# Patient Record
Sex: MALE | Race: WHITE | NOT HISPANIC OR LATINO | Employment: FULL TIME | URBAN - METROPOLITAN AREA
[De-identification: names, ages, dates, MRNs, and addresses within clinical notes are randomized per-mention and may not be internally consistent; named-entity substitution may affect disease eponyms.]

---

## 2017-02-20 ENCOUNTER — OFFICE VISIT (OUTPATIENT)
Dept: FAMILY MEDICINE CLINIC | Facility: CLINIC | Age: 37
End: 2017-02-20

## 2017-02-20 VITALS
HEIGHT: 72 IN | RESPIRATION RATE: 16 BRPM | TEMPERATURE: 98.2 F | BODY MASS INDEX: 38.31 KG/M2 | HEART RATE: 76 BPM | DIASTOLIC BLOOD PRESSURE: 80 MMHG | WEIGHT: 282.8 LBS | SYSTOLIC BLOOD PRESSURE: 110 MMHG

## 2017-02-20 DIAGNOSIS — F41.9 ANXIETY: ICD-10-CM

## 2017-02-20 DIAGNOSIS — E29.1 HYPOGONADISM IN MALE: ICD-10-CM

## 2017-02-20 DIAGNOSIS — J30.9 ATOPIC RHINITIS: ICD-10-CM

## 2017-02-20 DIAGNOSIS — E78.2 MIXED HYPERLIPIDEMIA: Primary | ICD-10-CM

## 2017-02-20 DIAGNOSIS — R74.8 ABNORMAL LIVER ENZYMES: ICD-10-CM

## 2017-02-20 PROCEDURE — 99214 OFFICE O/P EST MOD 30 MIN: CPT | Performed by: FAMILY MEDICINE

## 2017-02-20 RX ORDER — TESTOSTERONE 10 MG/.5G
2 GEL, METERED TOPICAL DAILY
Qty: 180 G | Refills: 3 | Status: SHIPPED | OUTPATIENT
Start: 2017-02-20 | End: 2017-12-15 | Stop reason: SDUPTHER

## 2017-02-20 NOTE — PROGRESS NOTES
Subjective    FU HLP, hypogonadism, elevated LFT's, anxiety & AR.   NOTE: pt is fasting for labs.   Needs RF on Testosterone gel today.    Gerson Simons is a 36 y.o. male.     History of Present Illness     Really changing diet - cut sugar out, watching the kind of carbs he eats; excited about changes in how feeling - increased energy.    Gym, [employer] at home - really started about two weeks ago    Overall been feeling well.  Work going well.    Anxiety has been good overall.     Going through the foster to adopt process with the state.    The following portions of the patient's history were reviewed and updated as appropriate: allergies, current medications, past medical history, past social history and problem list.    Review of Systems   Constitutional: Negative.  Negative for unexpected weight change.   HENT: Negative.    Respiratory: Negative.  Negative for cough and shortness of breath.    Cardiovascular: Negative.  Negative for chest pain.   Gastrointestinal: Negative.    Musculoskeletal: Positive for myalgias (from his recent workouts!).   Skin: Negative.  Negative for rash.   Neurological: Negative.  Negative for dizziness and headaches.   Psychiatric/Behavioral: Negative.        Objective   Physical Exam   Constitutional: He appears well-developed and well-nourished.   HENT:   Head: Normocephalic.   Eyes: Pupils are equal, round, and reactive to light.   Neck: Neck supple. Carotid bruit is not present.   Cardiovascular: Normal rate and regular rhythm.    Pulmonary/Chest: Effort normal and breath sounds normal.   Lymphadenopathy:     He has no cervical adenopathy.   Neurological: He is alert.   Skin: Skin is warm and dry.   Psychiatric: He has a normal mood and affect. His behavior is normal.   Nursing note and vitals reviewed.      Assessment/Plan   Gerson was seen today for follow-up, hyperlipidemia, hypogonadism, elevated lft's, anxiety and allergic rhinitis.    Diagnoses and all orders for this  visit:    Mixed hyperlipidemia  -     Comprehensive Metabolic Panel  -     Lipid Panel With LDL / HDL Ratio    Abnormal liver enzymes  -     Comprehensive Metabolic Panel  -     Gamma GT    Hypogonadism in male  -     Testosterone  -     Testosterone 10 MG/ACT (2%) gel; Place 2 Squirts on the skin Daily.    Atopic rhinitis    Anxiety    Overall doing well. Labs as noted. Great lifestyle changes - encouraged to keep it up!    Recheck six months.

## 2017-02-21 LAB
ALBUMIN SERPL-MCNC: 4.2 G/DL (ref 3.2–4.8)
ALBUMIN/GLOB SERPL: 1.5 G/DL (ref 1.5–2.5)
ALP SERPL-CCNC: 75 U/L (ref 25–100)
ALT SERPL-CCNC: 46 U/L (ref 7–40)
AST SERPL-CCNC: 27 U/L (ref 0–33)
BILIRUB SERPL-MCNC: 0.4 MG/DL (ref 0.3–1.2)
BUN SERPL-MCNC: 11 MG/DL (ref 9–23)
BUN/CREAT SERPL: 12.2 (ref 7–25)
CALCIUM SERPL-MCNC: 9.6 MG/DL (ref 8.7–10.4)
CHLORIDE SERPL-SCNC: 105 MMOL/L (ref 99–109)
CHOLEST SERPL-MCNC: 189 MG/DL (ref 0–200)
CO2 SERPL-SCNC: 30 MMOL/L (ref 20–31)
CONV COMMENT: ABNORMAL
CREAT SERPL-MCNC: 0.9 MG/DL (ref 0.6–1.3)
GGT SERPL-CCNC: 38 U/L (ref 0–72)
GLOBULIN SER CALC-MCNC: 2.8 GM/DL
GLUCOSE SERPL-MCNC: 93 MG/DL (ref 70–100)
HDLC SERPL-MCNC: 37 MG/DL (ref 40–60)
LDLC SERPL CALC-MCNC: 108 MG/DL (ref 0–100)
LDLC/HDLC SERPL: 2.93 {RATIO}
POTASSIUM SERPL-SCNC: 4.7 MMOL/L (ref 3.5–5.5)
PROT SERPL-MCNC: 7 G/DL (ref 5.7–8.2)
SODIUM SERPL-SCNC: 142 MMOL/L (ref 132–146)
TESTOST SERPL-MCNC: 147 NG/DL (ref 348–1197)
TRIGL SERPL-MCNC: 218 MG/DL (ref 0–150)
VLDLC SERPL CALC-MCNC: 43.6 MG/DL

## 2017-04-04 ENCOUNTER — OFFICE VISIT (OUTPATIENT)
Dept: FAMILY MEDICINE CLINIC | Facility: CLINIC | Age: 37
End: 2017-04-04

## 2017-04-04 VITALS
DIASTOLIC BLOOD PRESSURE: 78 MMHG | SYSTOLIC BLOOD PRESSURE: 106 MMHG | OXYGEN SATURATION: 98 % | TEMPERATURE: 97.1 F | RESPIRATION RATE: 18 BRPM | HEIGHT: 72 IN | BODY MASS INDEX: 38.33 KG/M2 | HEART RATE: 88 BPM | WEIGHT: 283 LBS

## 2017-04-04 DIAGNOSIS — F41.9 ANXIETY: Primary | ICD-10-CM

## 2017-04-04 DIAGNOSIS — F33.0 MILD EPISODE OF RECURRENT MAJOR DEPRESSIVE DISORDER (HCC): ICD-10-CM

## 2017-04-04 PROBLEM — F43.10 PTSD (POST-TRAUMATIC STRESS DISORDER): Status: ACTIVE | Noted: 2017-04-04

## 2017-04-04 PROCEDURE — 99213 OFFICE O/P EST LOW 20 MIN: CPT | Performed by: FAMILY MEDICINE

## 2017-04-04 RX ORDER — VENLAFAXINE HYDROCHLORIDE 75 MG/1
CAPSULE, EXTENDED RELEASE ORAL
Qty: 60 CAPSULE | Refills: 1 | Status: SHIPPED | OUTPATIENT
Start: 2017-04-04 | End: 2017-04-18 | Stop reason: SDUPTHER

## 2017-04-04 NOTE — PROGRESS NOTES
Subjective   Gerson Simons is a 36 y.o. male.     History of Present Illness     He has been on zoloft for quite a while  It did help him at first and made him feel better.  Did still have some anxiety with being in the mall and around people    He notes that his motivation is low    When he first started medicine:  Had some pTSD from the  and this was causing some mood issues  No desire to do anything  Was never happy about anything  Never had SI nor SI  Did not want to be around people and then developed some social phobia and was anxious around people  Just wants to lay around        Review of Systems   Constitutional: Negative.    Psychiatric/Behavioral: Positive for dysphoric mood. The patient is nervous/anxious.        Objective   Physical Exam   Constitutional: He appears well-developed and well-nourished. No distress.   Cardiovascular: Normal rate, regular rhythm and normal heart sounds.    Pulmonary/Chest: Effort normal and breath sounds normal.   Psychiatric: He has a normal mood and affect. His speech is normal and behavior is normal. Judgment and thought content normal. Cognition and memory are normal.   No SI and no HI   Nursing note and vitals reviewed.      Assessment/Plan   Gerson was seen today for anxiety and med refill.    Diagnoses and all orders for this visit:    Anxiety  -     venlafaxine XR (EFFEXOR XR) 75 MG 24 hr capsule; 1 po daily 6 days then 2 po daily60    Mild episode of recurrent major depressive disorder  -     venlafaxine XR (EFFEXOR XR) 75 MG 24 hr capsule; 1 po daily 6 days then 2 po daily60      He asks about effexor and will try it, titrate to 150.  Pt to call back in one month and consider continuing this dose or resuming zoloft.  Pt agrees and we will await his call

## 2017-04-06 ENCOUNTER — TELEPHONE (OUTPATIENT)
Dept: FAMILY MEDICINE CLINIC | Facility: CLINIC | Age: 37
End: 2017-04-06

## 2017-04-06 NOTE — TELEPHONE ENCOUNTER
Autumn at [facility] called because they were not sure if you were aware that when pt was switched to Fortesta from Androgel in Nov 2015 his dose dropped quite a bit. He had been on two pumps a day of Androgel and when switched to Fortesta it remained two pumps a day but the mg is different on Fortesta. He may not be getting enough. The starting dose of Fortesta is two pumps applied to each thigh daily for a total of 4 pumps daily.  I spoke w/ pt and he has only being doing a total of 2 pumps each day and his testosterone levels are still low.  Do you want to change this Rx?  I need to call both the pt and [facility] back with this answer.

## 2017-04-06 NOTE — TELEPHONE ENCOUNTER
----- Message from Keisha Feliciano sent at 4/6/2017  2:49 PM EDT -----  Contact: DIONTE BOURGEOIS HAS QUESTIONS FOR THE DOSAGE AND DIRECTIONS FOR THE TESTOSTERONE MEDICATION    [phone]

## 2017-04-09 NOTE — TELEPHONE ENCOUNTER
Well that likely explains his drop over time.  Increase his current dosing to two pumps to each thigh daily (4 total) and a QS for a month with 5 RFs.  Recheck level in 6 weeks.

## 2017-04-10 NOTE — TELEPHONE ENCOUNTER
Informed pt that we did want 2 pumps each thigh and I already got the prior auth for 3 bottles for a 90 day supply.

## 2017-04-18 ENCOUNTER — TELEPHONE (OUTPATIENT)
Dept: FAMILY MEDICINE CLINIC | Facility: CLINIC | Age: 37
End: 2017-04-18

## 2017-04-18 DIAGNOSIS — F33.0 MILD EPISODE OF RECURRENT MAJOR DEPRESSIVE DISORDER (HCC): ICD-10-CM

## 2017-04-18 DIAGNOSIS — F41.9 ANXIETY: ICD-10-CM

## 2017-04-18 RX ORDER — VENLAFAXINE HYDROCHLORIDE 150 MG/1
CAPSULE, EXTENDED RELEASE ORAL
Qty: 30 CAPSULE | Refills: 1 | Status: SHIPPED | OUTPATIENT
Start: 2017-04-18 | End: 2017-04-26 | Stop reason: SDUPTHER

## 2017-04-18 NOTE — TELEPHONE ENCOUNTER
----- Message from Keisha Feliciano sent at 4/18/2017 11:22 AM EDT -----  Contact: JORGE  PATIENT HAS TRIED THE MEDICATION VENLAFAXINE A FEW WEEKS AS INSTRUCTED AND IS DOING WELL. HE WOULD LIKE TO GET A RX CALLED INTO [facility] PLEASE.    VENLAFAXINE  [facility]

## 2017-04-18 NOTE — TELEPHONE ENCOUNTER
DETAILED VM LEFT FOR PT OF THIS MESSAGE AND REMINDER CARD WENT OUT TO PT TO SCHEDULE APPT IN 4 WKS.

## 2017-04-26 ENCOUNTER — OFFICE VISIT (OUTPATIENT)
Dept: FAMILY MEDICINE CLINIC | Facility: CLINIC | Age: 37
End: 2017-04-26

## 2017-04-26 VITALS
BODY MASS INDEX: 37.38 KG/M2 | HEART RATE: 74 BPM | SYSTOLIC BLOOD PRESSURE: 118 MMHG | DIASTOLIC BLOOD PRESSURE: 82 MMHG | HEIGHT: 72 IN | TEMPERATURE: 97 F | RESPIRATION RATE: 18 BRPM | WEIGHT: 276 LBS

## 2017-04-26 DIAGNOSIS — F33.0 MILD EPISODE OF RECURRENT MAJOR DEPRESSIVE DISORDER (HCC): Primary | ICD-10-CM

## 2017-04-26 DIAGNOSIS — F41.9 ANXIETY: ICD-10-CM

## 2017-04-26 DIAGNOSIS — Z83.49 FAMILY HISTORY OF THYROID DISEASE IN SISTER: ICD-10-CM

## 2017-04-26 LAB — TSH SERPL DL<=0.005 MIU/L-ACNC: 0.68 MIU/ML (ref 0.35–5.35)

## 2017-04-26 PROCEDURE — 99213 OFFICE O/P EST LOW 20 MIN: CPT | Performed by: FAMILY MEDICINE

## 2017-04-26 RX ORDER — VENLAFAXINE HYDROCHLORIDE 150 MG/1
CAPSULE, EXTENDED RELEASE ORAL
Qty: 30 CAPSULE | Refills: 3 | Status: SHIPPED | OUTPATIENT
Start: 2017-04-26 | End: 2017-06-07 | Stop reason: SDUPTHER

## 2017-04-26 NOTE — PROGRESS NOTES
Subjective   Gerson Simons is a 36 y.o. male.     History of Present Illness     He and his family have noticed an improvement in his mood  less irritable  He wants to do more things and enjoys activities more  Not staying in the house and avoiding things like he did before  No SE with the medicine    He also asks about getting his thyroid checked, family members with this as an issue      Review of Systems   Constitutional: Negative.        Objective   Physical Exam   Constitutional: He appears well-developed and well-nourished. No distress.   Cardiovascular: Normal rate, regular rhythm and normal heart sounds.    Pulmonary/Chest: Effort normal and breath sounds normal.   Psychiatric: He has a normal mood and affect. His behavior is normal.   Nursing note and vitals reviewed.      Assessment/Plan   Gerson was seen today for follow-up.    Diagnoses and all orders for this visit:    Mild episode of recurrent major depressive disorder  -     venlafaxine XR (EFFEXOR-XR) 150 MG 24 hr capsule; 1 po daily    Anxiety  -     venlafaxine XR (EFFEXOR-XR) 150 MG 24 hr capsule; 1 po daily  -     TSH    Family history of thyroid disease in sister  -     TSH    effexor working well  Will recheck his thyroid due to family history

## 2017-06-02 DIAGNOSIS — F33.0 MILD EPISODE OF RECURRENT MAJOR DEPRESSIVE DISORDER (HCC): ICD-10-CM

## 2017-06-02 DIAGNOSIS — F41.9 ANXIETY: ICD-10-CM

## 2017-06-02 RX ORDER — VENLAFAXINE HYDROCHLORIDE 150 MG/1
CAPSULE, EXTENDED RELEASE ORAL
Qty: 90 CAPSULE | Refills: 0 | Status: SHIPPED | OUTPATIENT
Start: 2017-06-02 | End: 2017-06-07

## 2017-06-07 ENCOUNTER — OFFICE VISIT (OUTPATIENT)
Dept: FAMILY MEDICINE CLINIC | Facility: CLINIC | Age: 37
End: 2017-06-07

## 2017-06-07 VITALS
HEIGHT: 72 IN | HEART RATE: 88 BPM | SYSTOLIC BLOOD PRESSURE: 118 MMHG | RESPIRATION RATE: 20 BRPM | DIASTOLIC BLOOD PRESSURE: 80 MMHG | WEIGHT: 280.5 LBS | TEMPERATURE: 96.8 F | BODY MASS INDEX: 37.99 KG/M2

## 2017-06-07 DIAGNOSIS — F33.0 MILD EPISODE OF RECURRENT MAJOR DEPRESSIVE DISORDER (HCC): ICD-10-CM

## 2017-06-07 DIAGNOSIS — F41.9 ANXIETY: ICD-10-CM

## 2017-06-07 DIAGNOSIS — R63.5 WEIGHT GAIN: Primary | ICD-10-CM

## 2017-06-07 PROCEDURE — 99213 OFFICE O/P EST LOW 20 MIN: CPT | Performed by: FAMILY MEDICINE

## 2017-06-07 RX ORDER — PHENTERMINE HYDROCHLORIDE 37.5 MG/1
37.5 TABLET ORAL
Qty: 30 TABLET | Refills: 1 | Status: SHIPPED | OUTPATIENT
Start: 2017-06-07 | End: 2017-08-03 | Stop reason: SDUPTHER

## 2017-06-07 RX ORDER — VENLAFAXINE HYDROCHLORIDE 150 MG/1
CAPSULE, EXTENDED RELEASE ORAL
Qty: 30 CAPSULE | Refills: 3 | Status: SHIPPED | OUTPATIENT
Start: 2017-06-07 | End: 2017-12-15 | Stop reason: SDUPTHER

## 2017-06-07 NOTE — PROGRESS NOTES
Subjective   Gerson Simosn is a 36 y.o. male.     History of Present Illness     He is interested in using phentermine again  He used it about 5 years ago and it really helped  He has been exercising  He has been reducing unhealthy food intake as a diet plan  His appetite is just a big issue    His mood has been doing great on the effexor  Family has noted this as well      Review of Systems   Constitutional: Negative.        Objective   Physical Exam   Constitutional: He appears well-developed and well-nourished. No distress.   Cardiovascular: Normal rate, regular rhythm and normal heart sounds.    Pulmonary/Chest: Effort normal and breath sounds normal.   Psychiatric: He has a normal mood and affect. His behavior is normal.   Nursing note and vitals reviewed.      Assessment/Plan   Gerson was seen today for weight gain.    Diagnoses and all orders for this visit:    Weight gain  -     phentermine (ADIPEX-P) 37.5 MG tablet; Take 1 tablet by mouth Every Morning Before Breakfast.    Anxiety  -     venlafaxine XR (EFFEXOR-XR) 150 MG 24 hr capsule; 1 po daily    Mild episode of recurrent major depressive disorder  -     venlafaxine XR (EFFEXOR-XR) 150 MG 24 hr capsule; 1 po daily    reviewed normal thyroid, cholesterol from 4/2017 and ok to use phentermine for 2 months.  Pros/cons/SE discussed with pt.  Diet and exercise emphasized.  Mood doing great on effexor, no change with this medicine

## 2017-08-03 DIAGNOSIS — R63.5 WEIGHT GAIN: ICD-10-CM

## 2017-08-04 RX ORDER — PHENTERMINE HYDROCHLORIDE 37.5 MG/1
TABLET ORAL
Qty: 30 TABLET | Refills: 0 | OUTPATIENT
Start: 2017-08-04 | End: 2017-12-15

## 2017-08-14 RX ORDER — MONTELUKAST SODIUM 10 MG/1
TABLET ORAL
Qty: 90 TABLET | Refills: 1 | Status: SHIPPED | OUTPATIENT
Start: 2017-08-14 | End: 2017-12-15 | Stop reason: SDUPTHER

## 2017-10-13 DIAGNOSIS — F41.9 ANXIETY: ICD-10-CM

## 2017-12-15 ENCOUNTER — OFFICE VISIT (OUTPATIENT)
Dept: FAMILY MEDICINE CLINIC | Facility: CLINIC | Age: 37
End: 2017-12-15

## 2017-12-15 VITALS
BODY MASS INDEX: 38.4 KG/M2 | TEMPERATURE: 98 F | RESPIRATION RATE: 18 BRPM | OXYGEN SATURATION: 98 % | SYSTOLIC BLOOD PRESSURE: 124 MMHG | DIASTOLIC BLOOD PRESSURE: 88 MMHG | HEIGHT: 72 IN | WEIGHT: 283.5 LBS | HEART RATE: 88 BPM

## 2017-12-15 DIAGNOSIS — F41.9 ANXIETY: ICD-10-CM

## 2017-12-15 DIAGNOSIS — E78.2 MIXED HYPERLIPIDEMIA: Primary | ICD-10-CM

## 2017-12-15 DIAGNOSIS — J30.89 CHRONIC NONSEASONAL ALLERGIC RHINITIS DUE TO OTHER ALLERGEN: ICD-10-CM

## 2017-12-15 DIAGNOSIS — E29.1 HYPOGONADISM IN MALE: ICD-10-CM

## 2017-12-15 DIAGNOSIS — F33.0 MILD EPISODE OF RECURRENT MAJOR DEPRESSIVE DISORDER (HCC): ICD-10-CM

## 2017-12-15 PROCEDURE — 99214 OFFICE O/P EST MOD 30 MIN: CPT | Performed by: FAMILY MEDICINE

## 2017-12-15 RX ORDER — TESTOSTERONE 10 MG/.5G
2 GEL, METERED TOPICAL DAILY
Qty: 180 G | Refills: 1 | Status: SHIPPED | OUTPATIENT
Start: 2017-12-15 | End: 2018-01-27 | Stop reason: SDUPTHER

## 2017-12-15 RX ORDER — MONTELUKAST SODIUM 10 MG/1
10 TABLET ORAL NIGHTLY
Qty: 90 TABLET | Refills: 1 | Status: SHIPPED | OUTPATIENT
Start: 2017-12-15 | End: 2018-09-17 | Stop reason: SDUPTHER

## 2017-12-15 RX ORDER — VENLAFAXINE HYDROCHLORIDE 150 MG/1
CAPSULE, EXTENDED RELEASE ORAL
Qty: 90 CAPSULE | Refills: 1 | Status: SHIPPED | OUTPATIENT
Start: 2017-12-15 | End: 2018-09-11 | Stop reason: SDUPTHER

## 2017-12-15 NOTE — PROGRESS NOTES
Assessment/Plan     Problem List Items Addressed This Visit        Cardiovascular and Mediastinum    Mixed hyperlipidemia - Primary    Relevant Orders    Comprehensive Metabolic Panel    Lipid Panel With / Chol / HDL Ratio       Respiratory    Atopic rhinitis    Relevant Medications    montelukast (SINGULAIR) 10 MG tablet       Endocrine    Hypogonadism in male    Relevant Medications    Testosterone 10 MG/ACT (2%) gel    Other Relevant Orders    CBC & Differential    Comprehensive Metabolic Panel    PSA    Testosterone       Other    Anxiety    Relevant Medications    venlafaxine XR (EFFEXOR-XR) 150 MG 24 hr capsule    Mild episode of recurrent major depressive disorder    Relevant Medications    venlafaxine XR (EFFEXOR-XR) 150 MG 24 hr capsule           Follow up: No Follow-up on file.     DISCUSSION  Hyperlipidemia.  Due for blood work.  Elevated triglycerides.  He agrees to follow-up next week when fasting.    Hypogonadism.  Recheck levels as noted.  He agrees to do so next week early in the morning.  Refilled medication.    Anxiety and depression.  Stable.  Continue Effexor.  No side effects.  Refilled medications.    Allergic rhinitis.  Stable.  Continue Singulair.      MEDICATIONS PRESCRIBED  Requested Prescriptions     Signed Prescriptions Disp Refills   • Testosterone 10 MG/ACT (2%) gel 180 g 1     Sig: Place 2 Squirts on the skin Daily.   • venlafaxine XR (EFFEXOR-XR) 150 MG 24 hr capsule 90 capsule 1     Si po daily   • montelukast (SINGULAIR) 10 MG tablet 90 tablet 1     Sig: Take 1 tablet by mouth Every Night.            Aayush dated on 12/15/2017   was reviewed and appropriate.        -------------------------------------------    Subjective     Chief Complaint   Patient presents with   • Hyperlipidemia   • Anxiety   • hypogonadism in male   • Med Refill   • Labs Only       Depression   Visit Type: follow-up  Patient presents with the following symptoms: depressed mood and nervousness/anxiety  "(anxiety maninly and the effexor helps).  Patient is not experiencing: suicidal ideas.  Frequency of symptoms: occasionally   Severity: mild   Sleep quality: good        Hypogonadism  On Testosterone  Prior to meds, + symptoms: lethargic, no sex drive, decreased sleep and wt gain  Since meds,. Feels better  No aggression with meds    Has been feeling well  No issues  No concerns  Due for labs    Hyperlipidemia  Increased lipids  INcreased TG  No meds   No diet changes    Past Medical History,Medications, Allergies, and social history was reviewed.    Review of Systems   Constitutional: Negative.    HENT: Negative.    Eyes: Negative.    Respiratory: Negative.    Cardiovascular: Negative.    Gastrointestinal: Negative.    Endocrine: Negative.    Genitourinary: Negative.  Negative for difficulty urinating, dysuria, frequency and urgency.   Musculoskeletal: Negative.    Neurological: Negative.    Psychiatric/Behavioral: Negative for suicidal ideas. The patient is nervous/anxious (anxiety maninly and the effexor helps).        Objective     Vitals:    12/15/17 1446   BP: 124/88   Pulse: 88   Resp: 18   Temp: 98 °F (36.7 °C)   TempSrc: Temporal Artery    SpO2: 98%   Weight: 129 kg (283 lb 8 oz)   Height: 182.9 cm (72.01\")        Physical Exam   Constitutional: He is oriented to person, place, and time. Vital signs are normal. He appears well-developed and well-nourished.   HENT:   Head: Normocephalic and atraumatic.   Right Ear: Hearing, tympanic membrane, external ear and ear canal normal.   Left Ear: Hearing, tympanic membrane, external ear and ear canal normal.   Mouth/Throat: Oropharynx is clear and moist.   Eyes: Conjunctivae, EOM and lids are normal. Pupils are equal, round, and reactive to light.   Neck: Normal range of motion. Neck supple. No thyromegaly present.   Cardiovascular: Normal rate, regular rhythm and normal heart sounds.  Exam reveals no friction rub.    No murmur heard.  Pulmonary/Chest: Effort normal " and breath sounds normal. No respiratory distress. He has no wheezes. He has no rales.   Abdominal: Soft. Normal appearance and bowel sounds are normal. He exhibits no distension and no mass. There is no tenderness. There is no guarding.   Musculoskeletal: He exhibits no edema.   Neurological: He is alert and oriented to person, place, and time. He has normal strength.   Skin: Skin is warm and dry.   Psychiatric: He has a normal mood and affect. His speech is normal and behavior is normal. Cognition and memory are normal.   Nursing note and vitals reviewed.              Fercho Jones MD

## 2017-12-18 ENCOUNTER — RESULTS ENCOUNTER (OUTPATIENT)
Dept: FAMILY MEDICINE CLINIC | Facility: CLINIC | Age: 37
End: 2017-12-18

## 2017-12-18 DIAGNOSIS — E78.2 MIXED HYPERLIPIDEMIA: ICD-10-CM

## 2017-12-18 DIAGNOSIS — E29.1 HYPOGONADISM IN MALE: ICD-10-CM

## 2017-12-30 LAB
ALBUMIN SERPL-MCNC: 4.6 G/DL (ref 3.2–4.8)
ALBUMIN/GLOB SERPL: 1.6 G/DL (ref 1.5–2.5)
ALP SERPL-CCNC: 96 U/L (ref 25–100)
ALT SERPL-CCNC: 53 U/L (ref 7–40)
AST SERPL-CCNC: 36 U/L (ref 0–33)
BASOPHILS # BLD AUTO: 0.05 10*3/MM3 (ref 0–0.2)
BASOPHILS NFR BLD AUTO: 0.6 % (ref 0–1)
BILIRUB SERPL-MCNC: 0.4 MG/DL (ref 0.3–1.2)
BUN SERPL-MCNC: 12 MG/DL (ref 9–23)
BUN/CREAT SERPL: 12 (ref 7–25)
CALCIUM SERPL-MCNC: 9.6 MG/DL (ref 8.7–10.4)
CHLORIDE SERPL-SCNC: 100 MMOL/L (ref 99–109)
CHOLEST SERPL-MCNC: 215 MG/DL (ref 0–200)
CHOLEST/HDLC SERPL: 5.12 {RATIO}
CO2 SERPL-SCNC: 28 MMOL/L (ref 20–31)
CREAT SERPL-MCNC: 1 MG/DL (ref 0.6–1.3)
EOSINOPHIL # BLD AUTO: 0.15 10*3/MM3 (ref 0–0.3)
EOSINOPHIL NFR BLD AUTO: 1.8 % (ref 0–3)
ERYTHROCYTE [DISTWIDTH] IN BLOOD BY AUTOMATED COUNT: 12.5 % (ref 11.3–14.5)
GLOBULIN SER CALC-MCNC: 2.9 GM/DL
GLUCOSE SERPL-MCNC: 95 MG/DL (ref 70–100)
HCT VFR BLD AUTO: 48.7 % (ref 38.9–50.9)
HDLC SERPL-MCNC: 42 MG/DL (ref 40–60)
HGB BLD-MCNC: 15.9 G/DL (ref 13.1–17.5)
IMM GRANULOCYTES # BLD: 0.04 10*3/MM3 (ref 0–0.03)
IMM GRANULOCYTES NFR BLD: 0.5 % (ref 0–0.6)
LDLC SERPL CALC-MCNC: 107 MG/DL (ref 0–100)
LYMPHOCYTES # BLD AUTO: 2.23 10*3/MM3 (ref 0.6–4.8)
LYMPHOCYTES NFR BLD AUTO: 26.9 % (ref 24–44)
MCH RBC QN AUTO: 29.9 PG (ref 27–31)
MCHC RBC AUTO-ENTMCNC: 32.6 G/DL (ref 32–36)
MCV RBC AUTO: 91.5 FL (ref 80–99)
MONOCYTES # BLD AUTO: 0.44 10*3/MM3 (ref 0–1)
MONOCYTES NFR BLD AUTO: 5.3 % (ref 0–12)
NEUTROPHILS # BLD AUTO: 5.38 10*3/MM3 (ref 1.5–8.3)
NEUTROPHILS NFR BLD AUTO: 64.9 % (ref 41–71)
PLATELET # BLD AUTO: 217 10*3/MM3 (ref 150–450)
POTASSIUM SERPL-SCNC: 4.4 MMOL/L (ref 3.5–5.5)
PROT SERPL-MCNC: 7.5 G/DL (ref 5.7–8.2)
PSA SERPL-MCNC: 0.67 NG/ML (ref 0–4)
RBC # BLD AUTO: 5.32 10*6/MM3 (ref 4.2–5.76)
SODIUM SERPL-SCNC: 138 MMOL/L (ref 132–146)
TESTOST SERPL-MCNC: 203 NG/DL (ref 264–916)
TRIGL SERPL-MCNC: 329 MG/DL (ref 0–150)
VLDLC SERPL CALC-MCNC: 65.8 MG/DL
WBC # BLD AUTO: 8.29 10*3/MM3 (ref 3.5–10.8)

## 2018-01-05 ENCOUNTER — TELEPHONE (OUTPATIENT)
Dept: FAMILY MEDICINE CLINIC | Facility: CLINIC | Age: 38
End: 2018-01-05

## 2018-01-05 NOTE — TELEPHONE ENCOUNTER
----- Message from Ann Pond sent at 1/5/2018  1:17 PM EST -----  Contact: DR ROBERTS LAB RESULTS  PATIENT IS CALLING BACK ABOUT LAB RESULTS HE WANTS YOU TO LEAVE THE INFO ON HIS VOICEMAIL FOR [phone]

## 2018-01-27 DIAGNOSIS — E29.1 HYPOGONADISM IN MALE: ICD-10-CM

## 2018-01-29 RX ORDER — TESTOSTERONE 10 MG/.5G
GEL, METERED TOPICAL
Qty: 180 G | Refills: 3 | OUTPATIENT
Start: 2018-01-29 | End: 2018-09-17

## 2018-09-11 ENCOUNTER — TELEPHONE (OUTPATIENT)
Dept: FAMILY MEDICINE CLINIC | Facility: CLINIC | Age: 38
End: 2018-09-11

## 2018-09-11 DIAGNOSIS — F33.0 MILD EPISODE OF RECURRENT MAJOR DEPRESSIVE DISORDER (HCC): ICD-10-CM

## 2018-09-11 DIAGNOSIS — F41.9 ANXIETY: ICD-10-CM

## 2018-09-11 RX ORDER — VENLAFAXINE HYDROCHLORIDE 150 MG/1
CAPSULE, EXTENDED RELEASE ORAL
Qty: 14 CAPSULE | Refills: 0 | Status: SHIPPED | OUTPATIENT
Start: 2018-09-11 | End: 2018-09-17 | Stop reason: SDUPTHER

## 2018-09-11 NOTE — TELEPHONE ENCOUNTER
Let pt know he can always get a 7 day emergency supply of medicine refilled from pharmacy without contacting us.  I did send in 2 week supply and will see him at follow up for longer refill

## 2018-09-11 NOTE — TELEPHONE ENCOUNTER
----- Message from Keisha Feliciano sent at 9/11/2018 12:09 PM EDT -----  Contact: JORGE  PATIENT HAS ONLY TWO PILLS LEFT OF HIS MEDICATION AND THE LAST TIME HE RAN OUT HE GO VERY SICK AND WAS IN BED FOR DAYS. HE HAS AN APPT. FOR Monday WITH YOU AND IS ASKING IF YOU CAN REFILL THIS ONE MEDICATION ENOUGH TO LAST HIM UNTIL  Monday    VENLAFAXINE XR 24 HR CAPSULE   1 PO DAILY  [facility] [place]

## 2018-09-17 ENCOUNTER — OFFICE VISIT (OUTPATIENT)
Dept: FAMILY MEDICINE CLINIC | Facility: CLINIC | Age: 38
End: 2018-09-17

## 2018-09-17 VITALS
BODY MASS INDEX: 38.6 KG/M2 | RESPIRATION RATE: 18 BRPM | TEMPERATURE: 97.4 F | HEART RATE: 78 BPM | SYSTOLIC BLOOD PRESSURE: 118 MMHG | WEIGHT: 285 LBS | HEIGHT: 72 IN | DIASTOLIC BLOOD PRESSURE: 71 MMHG

## 2018-09-17 DIAGNOSIS — F41.9 ANXIETY: ICD-10-CM

## 2018-09-17 DIAGNOSIS — F33.0 MILD EPISODE OF RECURRENT MAJOR DEPRESSIVE DISORDER (HCC): ICD-10-CM

## 2018-09-17 DIAGNOSIS — J30.89 CHRONIC NONSEASONAL ALLERGIC RHINITIS DUE TO OTHER ALLERGEN: ICD-10-CM

## 2018-09-17 PROCEDURE — 99213 OFFICE O/P EST LOW 20 MIN: CPT | Performed by: FAMILY MEDICINE

## 2018-09-17 RX ORDER — VENLAFAXINE HYDROCHLORIDE 150 MG/1
CAPSULE, EXTENDED RELEASE ORAL
Qty: 90 CAPSULE | Refills: 1 | Status: SHIPPED | OUTPATIENT
Start: 2018-09-17 | End: 2019-01-14 | Stop reason: SDUPTHER

## 2018-09-17 RX ORDER — MONTELUKAST SODIUM 10 MG/1
10 TABLET ORAL NIGHTLY
Qty: 90 TABLET | Refills: 1 | Status: SHIPPED | OUTPATIENT
Start: 2018-09-17 | End: 2019-01-14

## 2018-09-17 NOTE — PROGRESS NOTES
Subjective   Gerson Simons is a 37 y.o. male.     History of Present Illness     Mood has been doing well with the effexor  No SE and no complaints with this medicine  He has been happy with control    singulair has been working well for his allergies  No SE and no issues with this and he does want refills        Review of Systems   Constitutional: Negative.        Objective   Physical Exam   Constitutional: He appears well-developed and well-nourished. No distress.   Cardiovascular: Normal rate, regular rhythm and normal heart sounds.    Pulmonary/Chest: Effort normal and breath sounds normal.   Psychiatric: He has a normal mood and affect. His behavior is normal.   Nursing note and vitals reviewed.      Assessment/Plan   Gerson was seen today for follow-up.    Diagnoses and all orders for this visit:    Anxiety  -     venlafaxine XR (EFFEXOR-XR) 150 MG 24 hr capsule; 1 po daily    Mild episode of recurrent major depressive disorder (CMS/HCC)  -     venlafaxine XR (EFFEXOR-XR) 150 MG 24 hr capsule; 1 po daily    Chronic nonseasonal allergic rhinitis due to other allergen  -     montelukast (SINGULAIR) 10 MG tablet; Take 1 tablet by mouth Every Night.    mood doing well, will continue effexor  Will also refill singulair with good control.  Pt agrees

## 2019-01-14 ENCOUNTER — OFFICE VISIT (OUTPATIENT)
Dept: FAMILY MEDICINE CLINIC | Facility: CLINIC | Age: 39
End: 2019-01-14

## 2019-01-14 VITALS
TEMPERATURE: 97.8 F | SYSTOLIC BLOOD PRESSURE: 120 MMHG | WEIGHT: 293 LBS | HEIGHT: 72 IN | RESPIRATION RATE: 18 BRPM | DIASTOLIC BLOOD PRESSURE: 84 MMHG | HEART RATE: 76 BPM | BODY MASS INDEX: 39.68 KG/M2

## 2019-01-14 DIAGNOSIS — F41.9 ANXIETY: ICD-10-CM

## 2019-01-14 DIAGNOSIS — R05.9 COUGH: Primary | ICD-10-CM

## 2019-01-14 DIAGNOSIS — F33.0 MILD EPISODE OF RECURRENT MAJOR DEPRESSIVE DISORDER (HCC): ICD-10-CM

## 2019-01-14 DIAGNOSIS — J30.89 NON-SEASONAL ALLERGIC RHINITIS DUE TO OTHER ALLERGIC TRIGGER: ICD-10-CM

## 2019-01-14 LAB
EXPIRATION DATE: NORMAL
FLUAV AG NPH QL: NEGATIVE
FLUBV AG NPH QL: NEGATIVE
INTERNAL CONTROL: NORMAL
Lab: NORMAL

## 2019-01-14 PROCEDURE — 87804 INFLUENZA ASSAY W/OPTIC: CPT | Performed by: FAMILY MEDICINE

## 2019-01-14 PROCEDURE — 99214 OFFICE O/P EST MOD 30 MIN: CPT | Performed by: FAMILY MEDICINE

## 2019-01-14 RX ORDER — BROMPHENIRAMINE MALEATE, PSEUDOEPHEDRINE HYDROCHLORIDE, AND DEXTROMETHORPHAN HYDROBROMIDE 2; 30; 10 MG/5ML; MG/5ML; MG/5ML
5 SYRUP ORAL 4 TIMES DAILY PRN
Qty: 180 ML | Refills: 0 | Status: SHIPPED | OUTPATIENT
Start: 2019-01-14 | End: 2019-01-22

## 2019-01-14 RX ORDER — MONTELUKAST SODIUM 10 MG/1
10 TABLET ORAL NIGHTLY
Qty: 30 TABLET | Refills: 5 | Status: SHIPPED | OUTPATIENT
Start: 2019-01-14 | End: 2019-07-05 | Stop reason: SDUPTHER

## 2019-01-14 RX ORDER — AZITHROMYCIN 250 MG/1
TABLET, FILM COATED ORAL
Qty: 6 TABLET | Refills: 0 | Status: SHIPPED | OUTPATIENT
Start: 2019-01-14 | End: 2019-01-22

## 2019-01-14 RX ORDER — VENLAFAXINE HYDROCHLORIDE 150 MG/1
CAPSULE, EXTENDED RELEASE ORAL
Qty: 90 CAPSULE | Refills: 1 | Status: SHIPPED | OUTPATIENT
Start: 2019-01-14 | End: 2019-07-05 | Stop reason: SDUPTHER

## 2019-01-14 NOTE — PROGRESS NOTES
Subjective   Gerson Simons is a 38 y.o. male.     History of Present Illness     Started the last week with illness  Lots of drainage and congestion  Then progressed with more coughing  Lots of coughing over the weekend and had chills  Went to the [facility] and placed him on pred as well as amox, diagnosed with sinusitis  He just continues to feel bad the last couple days  Chills, sweats and temp to 101    His mood has been doing well for several years  This continues to help his mood and he is happy with it    singulair is working well for his allergies  He does not need anything else  Has been happy with control    The following portions of the patient's history were reviewed and updated as appropriate: allergies, current medications, past family history, past medical history, past social history, past surgical history and problem list.    Review of Systems   Constitutional: Positive for chills and fever.   HENT: Positive for congestion.    Eyes: Negative.    Respiratory: Positive for cough. Negative for shortness of breath.    Cardiovascular: Negative.  Negative for chest pain.   Gastrointestinal: Negative.  Negative for nausea and vomiting.   Musculoskeletal: Negative.    Skin: Negative.    Neurological: Negative.    Psychiatric/Behavioral: Negative.    All other systems reviewed and are negative.      Objective   Physical Exam   Constitutional: He appears well-developed and well-nourished.   HENT:   Head: Normocephalic and atraumatic.   Right Ear: Hearing, tympanic membrane, external ear and ear canal normal.   Left Ear: Hearing, tympanic membrane, external ear and ear canal normal.   Nose: Nose normal.   Mouth/Throat: Uvula is midline, oropharynx is clear and moist and mucous membranes are normal.   PND   Eyes: Conjunctivae and EOM are normal.   Neck: Normal range of motion.   Cardiovascular: Normal rate, regular rhythm and normal heart sounds.   Pulmonary/Chest: Effort normal and breath sounds normal.  He has no wheezes.   Lymphadenopathy:     He has no cervical adenopathy.   Psychiatric: He has a normal mood and affect. His behavior is normal.   Nursing note and vitals reviewed.      Assessment/Plan   Gerson was seen today for urinary tract infection.    Diagnoses and all orders for this visit:    Cough  -     POC Influenza A / B    Anxiety  -     venlafaxine XR (EFFEXOR-XR) 150 MG 24 hr capsule; 1 po daily    Mild episode of recurrent major depressive disorder (CMS/HCC)  -     venlafaxine XR (EFFEXOR-XR) 150 MG 24 hr capsule; 1 po daily    Non-seasonal allergic rhinitis due to other allergic trigger  -     montelukast (SINGULAIR) 10 MG tablet; Take 1 tablet by mouth Every Night.    flu test negative but pt with sweats, cough and chills,  Will treat with Abx and cough medicine as the amox has not helped.  Would consider CXR INB, pt to call back with any further issues  Mood has been doing well on effexor, refilled today unchanged  Allergies well controlled with singulair, no change in regimen

## 2019-01-22 ENCOUNTER — OFFICE VISIT (OUTPATIENT)
Dept: FAMILY MEDICINE CLINIC | Facility: CLINIC | Age: 39
End: 2019-01-22

## 2019-01-22 VITALS
DIASTOLIC BLOOD PRESSURE: 88 MMHG | TEMPERATURE: 97.5 F | BODY MASS INDEX: 39.96 KG/M2 | WEIGHT: 295 LBS | SYSTOLIC BLOOD PRESSURE: 124 MMHG | RESPIRATION RATE: 16 BRPM | HEIGHT: 72 IN | HEART RATE: 86 BPM

## 2019-01-22 DIAGNOSIS — R05.9 COUGH: Primary | ICD-10-CM

## 2019-01-22 PROCEDURE — 99213 OFFICE O/P EST LOW 20 MIN: CPT | Performed by: FAMILY MEDICINE

## 2019-01-22 RX ORDER — DOXYCYCLINE 100 MG/1
100 TABLET ORAL 2 TIMES DAILY
Qty: 14 TABLET | Refills: 0 | Status: SHIPPED | OUTPATIENT
Start: 2019-01-22 | End: 2019-05-10

## 2019-01-22 RX ORDER — PREDNISONE 20 MG/1
40 TABLET ORAL DAILY
Qty: 10 TABLET | Refills: 0 | Status: SHIPPED | OUTPATIENT
Start: 2019-01-22 | End: 2019-05-10

## 2019-01-22 NOTE — PROGRESS NOTES
Subjective   Gerson Simons is a 38 y.o. male.     History of Present Illness     He was seen 1/14/19 and flu test was negative.  Placed on zpac at that time  Felt a little better for a couple days and then worse again  Fever, cough, congestion  Coughing and increase WOB are the major issues for him    The following portions of the patient's history were reviewed and updated as appropriate: allergies, current medications, past family history, past medical history, past social history, past surgical history and problem list.    Review of Systems   Constitutional: Negative for fever.   Respiratory: Positive for cough and shortness of breath.        Objective   Physical Exam   Constitutional: He appears well-developed and well-nourished.   HENT:   Head: Normocephalic and atraumatic.   Right Ear: Hearing, tympanic membrane, external ear and ear canal normal.   Left Ear: Hearing, tympanic membrane, external ear and ear canal normal.   Nose: Nose normal.   Mouth/Throat: Uvula is midline, oropharynx is clear and moist and mucous membranes are normal.   Eyes: Conjunctivae and EOM are normal.   Neck: Normal range of motion.   Cardiovascular: Normal rate, regular rhythm and normal heart sounds.   Pulmonary/Chest: Effort normal. He has rhonchi in the left lower field.   Lymphadenopathy:     He has no cervical adenopathy.   Psychiatric: He has a normal mood and affect. His behavior is normal.   Nursing note and vitals reviewed.      Assessment/Plan   Gerson was seen today for follow-up.    Diagnoses and all orders for this visit:    Cough  -     predniSONE (DELTASONE) 20 MG tablet; Take 2 tablets by mouth Daily.  -     doxycycline (ADOXA) 100 MG tablet; Take 1 tablet by mouth 2 (Two) Times a Day.    failed zpac and flu was negative.  Cough is major symptom.  Will treat with pred and doxy and he will call back INB.

## 2019-01-29 ENCOUNTER — TELEPHONE (OUTPATIENT)
Dept: FAMILY MEDICINE CLINIC | Facility: CLINIC | Age: 39
End: 2019-01-29

## 2019-01-29 RX ORDER — BENZONATATE 200 MG/1
200 CAPSULE ORAL 3 TIMES DAILY PRN
Qty: 30 CAPSULE | Refills: 1 | Status: SHIPPED | OUTPATIENT
Start: 2019-01-29 | End: 2019-05-10

## 2019-01-29 NOTE — TELEPHONE ENCOUNTER
----- Message from Ann Pond sent at 1/29/2019  4:09 PM EST -----  Contact: DR PATEL  PATIENT FEELS BETTER HOWEVER HE STILL HAS A VERY BAD COUGH. IS THERE SOMETHING THAT YOU CAN SEND IN THAT IS NOT COUGH SYRUP TO [facility] IN [place]? HE SAYS HE CAN NOT TAKE COUGH SYRUP AND DO HIS JOB.  [phone]

## 2019-02-01 ENCOUNTER — OFFICE VISIT (OUTPATIENT)
Dept: FAMILY MEDICINE CLINIC | Facility: CLINIC | Age: 39
End: 2019-02-01

## 2019-02-01 VITALS
HEART RATE: 78 BPM | DIASTOLIC BLOOD PRESSURE: 84 MMHG | HEIGHT: 72 IN | TEMPERATURE: 98.4 F | SYSTOLIC BLOOD PRESSURE: 124 MMHG | WEIGHT: 292 LBS | RESPIRATION RATE: 16 BRPM | BODY MASS INDEX: 39.55 KG/M2

## 2019-02-01 DIAGNOSIS — J40 BRONCHITIS: Primary | ICD-10-CM

## 2019-02-01 PROCEDURE — 99213 OFFICE O/P EST LOW 20 MIN: CPT | Performed by: FAMILY MEDICINE

## 2019-02-01 NOTE — PROGRESS NOTES
Subjective   Gerson Simons is a 38 y.o. male.     History of Present Illness     He feels better  Went back to work and had a coughing fit  Tessalon perles do help  Work wanted him to come in for evaluation  [facility]          Review of Systems   Constitutional: Negative for fever.   Respiratory: Positive for cough.        Objective   Physical Exam   Constitutional: He appears well-developed and well-nourished. No distress.   Cardiovascular: Normal rate, regular rhythm and normal heart sounds.   Pulmonary/Chest: Effort normal and breath sounds normal.   Psychiatric: He has a normal mood and affect. His behavior is normal.   Nursing note and vitals reviewed.      Assessment/Plan   Gerson was seen today for follow-up.    Diagnoses and all orders for this visit:    Bronchitis    recommend albuterol before work and tessalon perles as needed.  Ok to RTW at this time.  Pt will go back on Monday and ok for RTW note

## 2019-05-10 ENCOUNTER — OFFICE VISIT (OUTPATIENT)
Dept: FAMILY MEDICINE CLINIC | Facility: CLINIC | Age: 39
End: 2019-05-10

## 2019-05-10 VITALS
DIASTOLIC BLOOD PRESSURE: 84 MMHG | SYSTOLIC BLOOD PRESSURE: 118 MMHG | BODY MASS INDEX: 39.96 KG/M2 | HEART RATE: 78 BPM | HEIGHT: 72 IN | RESPIRATION RATE: 16 BRPM | WEIGHT: 295 LBS | TEMPERATURE: 98.4 F

## 2019-05-10 DIAGNOSIS — R05.9 COUGH: ICD-10-CM

## 2019-05-10 DIAGNOSIS — J01.00 ACUTE NON-RECURRENT MAXILLARY SINUSITIS: Primary | ICD-10-CM

## 2019-05-10 PROCEDURE — 99213 OFFICE O/P EST LOW 20 MIN: CPT | Performed by: FAMILY MEDICINE

## 2019-05-10 RX ORDER — AZITHROMYCIN 250 MG/1
TABLET, FILM COATED ORAL
Qty: 6 TABLET | Refills: 0 | Status: SHIPPED | OUTPATIENT
Start: 2019-05-10 | End: 2019-06-07

## 2019-05-10 NOTE — PROGRESS NOTES
Subjective   Gerson Simons is a 38 y.o. male.     History of Present Illness     Has been ill the last 2 weeks  Started with congestion  Then the cough got worse and he has had sinus pressure  Cough wakes him up thorugh the night  Cough is productive  No fevers noted  Taking OTC medicine without relief      Review of Systems   Constitutional: Negative for fever.   Respiratory: Positive for cough.        Objective   Physical Exam   Constitutional: He appears well-developed and well-nourished.   HENT:   Head: Normocephalic and atraumatic.   Right Ear: Hearing, tympanic membrane, external ear and ear canal normal.   Left Ear: Hearing, tympanic membrane, external ear and ear canal normal.   Nose: Nose normal.   Mouth/Throat: Uvula is midline, oropharynx is clear and moist and mucous membranes are normal.   Eyes: Conjunctivae and EOM are normal.   Neck: Normal range of motion.   Cardiovascular: Normal rate, regular rhythm and normal heart sounds.   Pulmonary/Chest: Effort normal. He has no wheezes. He has rhonchi in the right lower field and the left lower field.   Lymphadenopathy:     He has no cervical adenopathy.   Psychiatric: He has a normal mood and affect. His behavior is normal.   Nursing note and vitals reviewed.      Assessment/Plan   Gerson was seen today for uri.    Diagnoses and all orders for this visit:    Acute non-recurrent maxillary sinusitis  -     azithromycin (ZITHROMAX) 250 MG tablet; Take 2 tablets the first day, then 1 tablet daily for 4 days.    Cough  -     HYDROcodone-homatropine (HYCODAN) 5-1.5 MG/5ML syrup; Take 5 mL by mouth Every 6 (Six) Hours As Needed for Cough.    treat with Abx and hycodan to help with sleep.  Pt to call back INB  Work note given today

## 2019-06-07 ENCOUNTER — OFFICE VISIT (OUTPATIENT)
Dept: FAMILY MEDICINE CLINIC | Facility: CLINIC | Age: 39
End: 2019-06-07

## 2019-06-07 VITALS
RESPIRATION RATE: 18 BRPM | BODY MASS INDEX: 40.16 KG/M2 | WEIGHT: 296.5 LBS | HEART RATE: 84 BPM | SYSTOLIC BLOOD PRESSURE: 120 MMHG | TEMPERATURE: 97.5 F | DIASTOLIC BLOOD PRESSURE: 82 MMHG | HEIGHT: 72 IN

## 2019-06-07 DIAGNOSIS — J30.2 SEASONAL ALLERGIES: ICD-10-CM

## 2019-06-07 DIAGNOSIS — J01.01 ACUTE RECURRENT MAXILLARY SINUSITIS: Primary | ICD-10-CM

## 2019-06-07 PROCEDURE — 99213 OFFICE O/P EST LOW 20 MIN: CPT | Performed by: FAMILY MEDICINE

## 2019-06-07 RX ORDER — DOXYCYCLINE 100 MG/1
100 TABLET ORAL 2 TIMES DAILY
Qty: 20 TABLET | Refills: 0 | Status: SHIPPED | OUTPATIENT
Start: 2019-06-07 | End: 2019-06-17

## 2019-06-07 RX ORDER — LORATADINE 10 MG/1
10 TABLET ORAL DAILY
Qty: 30 TABLET | Refills: 5 | Status: SHIPPED | OUTPATIENT
Start: 2019-06-07 | End: 2020-03-16 | Stop reason: SDUPTHER

## 2019-06-07 NOTE — PROGRESS NOTES
Subjective   Gerson Simons is a 38 y.o. male.     History of Present Illness     He was on axithromycin and felt better  Then his allergies were worse and all the symptoms came back  Cough that is productive  He has had some SOA  No fevers  The congestion and cough are the major issues      The following portions of the patient's history were reviewed and updated as appropriate: allergies, current medications, past family history, past medical history, past social history, past surgical history and problem list.    Review of Systems   Constitutional: Negative for fever.   HENT: Positive for congestion.    Respiratory: Positive for cough.        Objective   Physical Exam   Constitutional: He appears well-developed and well-nourished.   HENT:   Head: Normocephalic and atraumatic.   Right Ear: Hearing, tympanic membrane, external ear and ear canal normal.   Left Ear: Hearing, tympanic membrane, external ear and ear canal normal.   Nose: Nose normal.   Mouth/Throat: Uvula is midline, oropharynx is clear and moist and mucous membranes are normal.   Eyes: Conjunctivae and EOM are normal.   Neck: Normal range of motion.   Cardiovascular: Normal rate, regular rhythm and normal heart sounds.   Pulmonary/Chest: Effort normal and breath sounds normal.   Lymphadenopathy:     He has no cervical adenopathy.   Psychiatric: He has a normal mood and affect. His behavior is normal.   Nursing note and vitals reviewed.      Assessment/Plan   Gerson was seen today for uri.    Diagnoses and all orders for this visit:    Acute recurrent maxillary sinusitis  -     doxycycline (ADOXA) 100 MG tablet; Take 1 tablet by mouth 2 (Two) Times a Day for 10 days.    Seasonal allergies  -     loratadine (CLARITIN) 10 MG tablet; Take 1 tablet by mouth Daily.    doxy 100 BID 10 days, claritin for allergies and tp to call back INB

## 2019-07-05 DIAGNOSIS — F41.9 ANXIETY: ICD-10-CM

## 2019-07-05 DIAGNOSIS — J30.89 NON-SEASONAL ALLERGIC RHINITIS DUE TO OTHER ALLERGIC TRIGGER: ICD-10-CM

## 2019-07-05 DIAGNOSIS — F33.0 MILD EPISODE OF RECURRENT MAJOR DEPRESSIVE DISORDER (HCC): ICD-10-CM

## 2019-07-05 RX ORDER — MONTELUKAST SODIUM 10 MG/1
10 TABLET ORAL NIGHTLY
Qty: 30 TABLET | Refills: 3 | Status: SHIPPED | OUTPATIENT
Start: 2019-07-05 | End: 2020-03-16

## 2019-07-05 RX ORDER — VENLAFAXINE HYDROCHLORIDE 150 MG/1
CAPSULE, EXTENDED RELEASE ORAL
Qty: 90 CAPSULE | Refills: 0 | Status: SHIPPED | OUTPATIENT
Start: 2019-07-05 | End: 2019-07-22 | Stop reason: SDUPTHER

## 2019-07-22 ENCOUNTER — OFFICE VISIT (OUTPATIENT)
Dept: FAMILY MEDICINE CLINIC | Facility: CLINIC | Age: 39
End: 2019-07-22

## 2019-07-22 VITALS
DIASTOLIC BLOOD PRESSURE: 82 MMHG | SYSTOLIC BLOOD PRESSURE: 120 MMHG | RESPIRATION RATE: 16 BRPM | HEART RATE: 68 BPM | TEMPERATURE: 97.3 F | BODY MASS INDEX: 40.63 KG/M2 | HEIGHT: 72 IN | WEIGHT: 300 LBS

## 2019-07-22 DIAGNOSIS — F33.0 MILD EPISODE OF RECURRENT MAJOR DEPRESSIVE DISORDER (HCC): ICD-10-CM

## 2019-07-22 DIAGNOSIS — R63.5 WEIGHT GAIN: ICD-10-CM

## 2019-07-22 DIAGNOSIS — E78.2 MIXED HYPERLIPIDEMIA: ICD-10-CM

## 2019-07-22 DIAGNOSIS — G47.8 UNREFRESHED BY SLEEP: ICD-10-CM

## 2019-07-22 DIAGNOSIS — R74.8 ABNORMAL LIVER ENZYMES: ICD-10-CM

## 2019-07-22 DIAGNOSIS — Z00.00 WELL ADULT EXAM: Primary | ICD-10-CM

## 2019-07-22 DIAGNOSIS — E29.1 HYPOGONADISM IN MALE: ICD-10-CM

## 2019-07-22 DIAGNOSIS — R53.82 CHRONIC FATIGUE: ICD-10-CM

## 2019-07-22 DIAGNOSIS — F41.9 ANXIETY: ICD-10-CM

## 2019-07-22 DIAGNOSIS — R29.818 SUSPECTED SLEEP APNEA: ICD-10-CM

## 2019-07-22 PROCEDURE — 99395 PREV VISIT EST AGE 18-39: CPT | Performed by: FAMILY MEDICINE

## 2019-07-22 RX ORDER — VENLAFAXINE HYDROCHLORIDE 150 MG/1
CAPSULE, EXTENDED RELEASE ORAL
Qty: 90 CAPSULE | Refills: 0 | Status: SHIPPED | OUTPATIENT
Start: 2019-07-22 | End: 2019-09-06 | Stop reason: SDUPTHER

## 2019-07-22 NOTE — PROGRESS NOTES
Subjective   Gerson Simons is a 38 y.o. male.     History of Present Illness     Gerson Simons 38 y.o. male who presents for yearly preventive exam.  His overall health is: poor  He exercises walking at work as well as for exercise.  He does see his dentist regularly  His diet is eating better but not losing weight  He describes his alcohol intake as none  He knows what his cholesterol is Yes  He is sexually active  He does not have ED or other bedroom issues  Does patient smoke No    Pt feels tired all the time still  He used to be on testosterone but has not used this for years  He snores and wakes up tired still  He is not sleeping well, he falls asleep but will wak eup tossing and turning  This was because he snored so loud that he woke himself up  He has been gaining weight as well  Eating better and walking more without results    The following portions of the patient's history were reviewed and updated as appropriate: allergies, current medications, past family history, past medical history, past social history, past surgical history and problem list.    Review of Systems   Constitutional: Positive for fatigue.   HENT: Negative.    Eyes: Negative.    Respiratory: Negative.  Negative for shortness of breath.    Cardiovascular: Negative.  Negative for chest pain.   Gastrointestinal: Negative.    Genitourinary: Negative.    Musculoskeletal: Negative.    Skin: Negative.    Neurological: Negative.    Psychiatric/Behavioral: Negative.  Negative for dysphoric mood. The patient is not nervous/anxious.    All other systems reviewed and are negative.      Objective   Physical Exam   Constitutional: He is oriented to person, place, and time. He appears well-developed and well-nourished. No distress.   HENT:   Head: Normocephalic and atraumatic.   Right Ear: Tympanic membrane, external ear and ear canal normal.   Left Ear: Tympanic membrane, external ear and ear canal normal.   Nose: Nose normal.   Mouth/Throat:  Uvula is midline and oropharynx is clear and moist.   Eyes: Conjunctivae and EOM are normal.   Neck: Normal range of motion. Neck supple. No thyromegaly present.   Cardiovascular: Normal rate, regular rhythm and normal heart sounds.   No murmur heard.  Pulmonary/Chest: Effort normal and breath sounds normal. No respiratory distress.   Abdominal: Soft. Bowel sounds are normal. He exhibits no distension and no mass. There is no tenderness.   Lymphadenopathy:     He has no cervical adenopathy.   Neurological: He is alert and oriented to person, place, and time.   Skin: Skin is warm and dry.   Psychiatric: He has a normal mood and affect. His behavior is normal. Judgment and thought content normal.   Nursing note and vitals reviewed.      Assessment/Plan   Gerson was seen today for annual exam.    Diagnoses and all orders for this visit:    Well adult exam    Unrefreshed by sleep  -     Home Sleep Study; Future    Suspected sleep apnea  -     Home Sleep Study; Future    Chronic fatigue  -     CBC & Differential  -     Comprehensive Metabolic Panel  -     Lipid Panel  -     Testosterone  -     TSH    Weight gain    Mixed hyperlipidemia  -     Comprehensive Metabolic Panel  -     Lipid Panel    Hypogonadism in male  -     Testosterone    Abnormal liver enzymes  -     Comprehensive Metabolic Panel    Anxiety  -     venlafaxine XR (EFFEXOR-XR) 150 MG 24 hr capsule; TAKE 1 CAPSULE BY MOUTH DAILY    Mild episode of recurrent major depressive disorder (CMS/HCC)  -     venlafaxine XR (EFFEXOR-XR) 150 MG 24 hr capsule; TAKE 1 CAPSULE BY MOUTH DAILY    counseled about well adult issues including diet, exercise, and weight loss.  Will check labs and sleep study for fatigue with strong suspicion of sleep apnea.  Will recheck lipids  Continue effexor for mood

## 2019-07-23 ENCOUNTER — TELEPHONE (OUTPATIENT)
Dept: FAMILY MEDICINE CLINIC | Facility: CLINIC | Age: 39
End: 2019-07-23

## 2019-07-23 LAB
ALBUMIN SERPL-MCNC: 4.7 G/DL (ref 3.5–5.2)
ALBUMIN/GLOB SERPL: 1.9 G/DL
ALP SERPL-CCNC: 78 U/L (ref 39–117)
ALT SERPL-CCNC: 47 U/L (ref 1–41)
AST SERPL-CCNC: 28 U/L (ref 1–40)
BASOPHILS # BLD AUTO: 0.07 10*3/MM3 (ref 0–0.2)
BASOPHILS NFR BLD AUTO: 0.9 % (ref 0–1.5)
BILIRUB SERPL-MCNC: 0.2 MG/DL (ref 0.2–1.2)
BUN SERPL-MCNC: 13 MG/DL (ref 6–20)
BUN/CREAT SERPL: 19.4 (ref 7–25)
CALCIUM SERPL-MCNC: 9.2 MG/DL (ref 8.6–10.5)
CHLORIDE SERPL-SCNC: 102 MMOL/L (ref 98–107)
CHOLEST SERPL-MCNC: 187 MG/DL (ref 0–200)
CO2 SERPL-SCNC: 29.3 MMOL/L (ref 22–29)
CREAT SERPL-MCNC: 0.67 MG/DL (ref 0.76–1.27)
EOSINOPHIL # BLD AUTO: 0.12 10*3/MM3 (ref 0–0.4)
EOSINOPHIL NFR BLD AUTO: 1.5 % (ref 0.3–6.2)
ERYTHROCYTE [DISTWIDTH] IN BLOOD BY AUTOMATED COUNT: 13 % (ref 12.3–15.4)
GLOBULIN SER CALC-MCNC: 2.5 GM/DL
GLUCOSE SERPL-MCNC: 90 MG/DL (ref 65–99)
HCT VFR BLD AUTO: 45.5 % (ref 37.5–51)
HDLC SERPL-MCNC: 35 MG/DL (ref 40–60)
HGB BLD-MCNC: 14.3 G/DL (ref 13–17.7)
IMM GRANULOCYTES # BLD AUTO: 0.05 10*3/MM3 (ref 0–0.05)
IMM GRANULOCYTES NFR BLD AUTO: 0.6 % (ref 0–0.5)
LDLC SERPL CALC-MCNC: ABNORMAL MG/DL
LYMPHOCYTES # BLD AUTO: 2.25 10*3/MM3 (ref 0.7–3.1)
LYMPHOCYTES NFR BLD AUTO: 27.7 % (ref 19.6–45.3)
MCH RBC QN AUTO: 29.7 PG (ref 26.6–33)
MCHC RBC AUTO-ENTMCNC: 31.4 G/DL (ref 31.5–35.7)
MCV RBC AUTO: 94.6 FL (ref 79–97)
MONOCYTES # BLD AUTO: 0.55 10*3/MM3 (ref 0.1–0.9)
MONOCYTES NFR BLD AUTO: 6.8 % (ref 5–12)
NEUTROPHILS # BLD AUTO: 5.07 10*3/MM3 (ref 1.7–7)
NEUTROPHILS NFR BLD AUTO: 62.5 % (ref 42.7–76)
NRBC BLD AUTO-RTO: 0 /100 WBC (ref 0–0.2)
PLATELET # BLD AUTO: 233 10*3/MM3 (ref 140–450)
POTASSIUM SERPL-SCNC: 4.4 MMOL/L (ref 3.5–5.2)
PROT SERPL-MCNC: 7.2 G/DL (ref 6–8.5)
RBC # BLD AUTO: 4.81 10*6/MM3 (ref 4.14–5.8)
SODIUM SERPL-SCNC: 142 MMOL/L (ref 136–145)
TESTOST SERPL-MCNC: 73 NG/DL (ref 264–916)
TRIGL SERPL-MCNC: 659 MG/DL (ref 0–150)
TSH SERPL DL<=0.005 MIU/L-ACNC: 1.56 UIU/ML (ref 0.45–4.5)
VLDLC SERPL CALC-MCNC: ABNORMAL MG/DL
WBC # BLD AUTO: 8.11 10*3/MM3 (ref 3.4–10.8)

## 2019-07-23 NOTE — TELEPHONE ENCOUNTER
----- Message from Keisha Zayas sent at 7/23/2019  2:57 PM EDT -----  Contact: JORGE;PT CALLED  PT CALLING FOR LAB RESULTS    [phone]

## 2019-08-02 ENCOUNTER — OFFICE VISIT (OUTPATIENT)
Dept: FAMILY MEDICINE CLINIC | Facility: CLINIC | Age: 39
End: 2019-08-02

## 2019-08-02 VITALS
BODY MASS INDEX: 39.96 KG/M2 | HEART RATE: 78 BPM | DIASTOLIC BLOOD PRESSURE: 90 MMHG | HEIGHT: 72 IN | RESPIRATION RATE: 18 BRPM | WEIGHT: 295 LBS | TEMPERATURE: 98.2 F | SYSTOLIC BLOOD PRESSURE: 140 MMHG

## 2019-08-02 DIAGNOSIS — G47.33 OSA (OBSTRUCTIVE SLEEP APNEA): ICD-10-CM

## 2019-08-02 DIAGNOSIS — E29.1 HYPOGONADISM IN MALE: ICD-10-CM

## 2019-08-02 DIAGNOSIS — R45.4 IRRITABILITY: ICD-10-CM

## 2019-08-02 DIAGNOSIS — F41.9 ANXIETY: Primary | ICD-10-CM

## 2019-08-02 DIAGNOSIS — F33.0 MILD EPISODE OF RECURRENT MAJOR DEPRESSIVE DISORDER (HCC): ICD-10-CM

## 2019-08-02 PROCEDURE — 99214 OFFICE O/P EST MOD 30 MIN: CPT | Performed by: FAMILY MEDICINE

## 2019-08-02 RX ORDER — VENLAFAXINE HYDROCHLORIDE 75 MG/1
75 CAPSULE, EXTENDED RELEASE ORAL DAILY
Qty: 30 CAPSULE | Refills: 2 | Status: SHIPPED | OUTPATIENT
Start: 2019-08-02 | End: 2019-09-06 | Stop reason: SDUPTHER

## 2019-08-02 NOTE — PROGRESS NOTES
Subjective   Gerson Simons is a 38 y.o. male.     History of Present Illness     He has been on effexor for a long time  This did help his anxiety and depression when he first started it  He has been much more irritable, less patient and more byrd  No sex drive either    His T was 73, pt used to be on steroid replacement and it really helped him feel much better  This did seem to help, stopped it about 2 years ago    geo was detected with AHI of 17.6, he is using the machine at this time  He does feel it is helping for the last several days  No issues and the mask seems to be tolerable      The following portions of the patient's history were reviewed and updated as appropriate: allergies, current medications, past family history, past medical history, past social history, past surgical history and problem list.    Review of Systems   Constitutional: Positive for fatigue.   HENT: Negative.    Eyes: Negative.    Respiratory: Negative.  Negative for shortness of breath.    Cardiovascular: Negative.  Negative for chest pain and palpitations.   Gastrointestinal: Negative.    Genitourinary:        Ed   Musculoskeletal: Negative.    Skin: Negative.    Neurological: Negative.    Psychiatric/Behavioral: Positive for agitation and dysphoric mood. The patient is nervous/anxious.    All other systems reviewed and are negative.      Objective   Physical Exam   Constitutional: He appears well-developed and well-nourished. No distress.   Cardiovascular: Normal rate, regular rhythm and normal heart sounds.   Pulmonary/Chest: Effort normal and breath sounds normal.   Psychiatric: He has a normal mood and affect. His behavior is normal.   Nursing note and vitals reviewed.      Assessment/Plan   Gerson was seen today for testosterone.    Diagnoses and all orders for this visit:    Anxiety  -     venlafaxine XR (EFFEXOR XR) 75 MG 24 hr capsule; Take 1 capsule by mouth Daily. Take with 150 mg for total dose of 225 mg daily    Mild  episode of recurrent major depressive disorder (CMS/HCC)  -     venlafaxine XR (EFFEXOR XR) 75 MG 24 hr capsule; Take 1 capsule by mouth Daily. Take with 150 mg for total dose of 225 mg daily    Irritability  -     venlafaxine XR (EFFEXOR XR) 75 MG 24 hr capsule; Take 1 capsule by mouth Daily. Take with 150 mg for total dose of 225 mg daily    Hypogonadism in male  -     Testosterone    JUAN (obstructive sleep apnea)    will increase effexor from 150 to 225 and recheck in one month.  He will call with any issues  Recheck T levels, he would like to resume T gel if possible  JUAN being treated with CPAP and pt feels it is helping.  Using the machine on consistent basis.  Will recheck in one month.  AHI 17.6

## 2019-08-03 LAB — TESTOST SERPL-MCNC: 75 NG/DL (ref 264–916)

## 2019-08-05 RX ORDER — TESTOSTERONE 16.2 MG/G
GEL TRANSDERMAL
Qty: 75 G | Refills: 5 | Status: SHIPPED | OUTPATIENT
Start: 2019-08-05 | End: 2020-03-16 | Stop reason: SDUPTHER

## 2019-09-06 ENCOUNTER — OFFICE VISIT (OUTPATIENT)
Dept: FAMILY MEDICINE CLINIC | Facility: CLINIC | Age: 39
End: 2019-09-06

## 2019-09-06 VITALS
BODY MASS INDEX: 41.17 KG/M2 | WEIGHT: 304 LBS | DIASTOLIC BLOOD PRESSURE: 82 MMHG | TEMPERATURE: 98 F | HEART RATE: 80 BPM | HEIGHT: 72 IN | SYSTOLIC BLOOD PRESSURE: 118 MMHG | RESPIRATION RATE: 18 BRPM

## 2019-09-06 DIAGNOSIS — R45.4 IRRITABILITY: ICD-10-CM

## 2019-09-06 DIAGNOSIS — F33.0 MILD EPISODE OF RECURRENT MAJOR DEPRESSIVE DISORDER (HCC): Primary | ICD-10-CM

## 2019-09-06 DIAGNOSIS — G47.33 OSA (OBSTRUCTIVE SLEEP APNEA): ICD-10-CM

## 2019-09-06 DIAGNOSIS — E29.1 HYPOGONADISM IN MALE: ICD-10-CM

## 2019-09-06 DIAGNOSIS — F41.9 ANXIETY: ICD-10-CM

## 2019-09-06 PROCEDURE — 99214 OFFICE O/P EST MOD 30 MIN: CPT | Performed by: FAMILY MEDICINE

## 2019-09-06 RX ORDER — VENLAFAXINE HYDROCHLORIDE 75 MG/1
75 CAPSULE, EXTENDED RELEASE ORAL DAILY
Qty: 90 CAPSULE | Refills: 1 | Status: SHIPPED | OUTPATIENT
Start: 2019-09-06 | End: 2020-03-16 | Stop reason: SDUPTHER

## 2019-09-06 RX ORDER — VENLAFAXINE HYDROCHLORIDE 150 MG/1
CAPSULE, EXTENDED RELEASE ORAL
Qty: 90 CAPSULE | Refills: 1 | Status: SHIPPED | OUTPATIENT
Start: 2019-09-06 | End: 2020-03-16 | Stop reason: SDUPTHER

## 2019-09-06 NOTE — PROGRESS NOTES
Subjective   Gerson Simons is a 38 y.o. male.     History of Present Illness     Pt overall is feeling great compared to last time!    His mood is doing much better on the effexor 225  He feels this greatly has improved his mood with no side effects  Just feeling more positive and less depression    He is tolerating his CPAP machine and that is helping his energy a lot  No SE  The longer he has used it the better his energy has been    T replacement also working well  No complaints about this and he feels his sex drive and energy level are much better  No skin irritation nor SE with this medicine      The following portions of the patient's history were reviewed and updated as appropriate: allergies, current medications, past family history, past medical history, past social history, past surgical history and problem list.    Review of Systems   Constitutional: Negative.  Negative for fatigue.   HENT: Negative.    Eyes: Negative.    Respiratory: Negative.  Negative for shortness of breath.    Cardiovascular: Negative.    Gastrointestinal: Negative.    Musculoskeletal: Negative.    Skin: Negative.    Neurological: Negative.    Psychiatric/Behavioral: Negative.  Negative for dysphoric mood. The patient is not nervous/anxious.    All other systems reviewed and are negative.      Objective   Physical Exam   Constitutional: He appears well-developed and well-nourished. No distress.   Cardiovascular: Normal rate, regular rhythm and normal heart sounds.   Pulmonary/Chest: Effort normal and breath sounds normal.   Psychiatric: He has a normal mood and affect. His behavior is normal.   Nursing note and vitals reviewed.      Assessment/Plan   Diagnoses and all orders for this visit:    Mild episode of recurrent major depressive disorder (CMS/HCC)  -     venlafaxine XR (EFFEXOR-XR) 150 MG 24 hr capsule; TAKE 1 CAPSULE BY MOUTH DAILY  -     venlafaxine XR (EFFEXOR XR) 75 MG 24 hr capsule; Take 1 capsule by mouth Daily. Take  with 150 mg for total dose of 225 mg daily    Hypogonadism in male  -     CBC & Differential  -     PSA Screen  -     Testosterone    JUAN (obstructive sleep apnea)    Anxiety  -     venlafaxine XR (EFFEXOR-XR) 150 MG 24 hr capsule; TAKE 1 CAPSULE BY MOUTH DAILY  -     venlafaxine XR (EFFEXOR XR) 75 MG 24 hr capsule; Take 1 capsule by mouth Daily. Take with 150 mg for total dose of 225 mg daily    Irritability  -     venlafaxine XR (EFFEXOR XR) 75 MG 24 hr capsule; Take 1 capsule by mouth Daily. Take with 150 mg for total dose of 225 mg daily    mood is doing great on effexor 225, much better than the 150  Will recheck T level along with PSA and CBC, continue androgel  JUAN stable, continue CPAP

## 2019-09-07 LAB
BASOPHILS # BLD AUTO: 0.06 10*3/MM3 (ref 0–0.2)
BASOPHILS NFR BLD AUTO: 0.9 % (ref 0–1.5)
EOSINOPHIL # BLD AUTO: 0.1 10*3/MM3 (ref 0–0.4)
EOSINOPHIL NFR BLD AUTO: 1.4 % (ref 0.3–6.2)
ERYTHROCYTE [DISTWIDTH] IN BLOOD BY AUTOMATED COUNT: 12.8 % (ref 12.3–15.4)
HCT VFR BLD AUTO: 44.6 % (ref 37.5–51)
HGB BLD-MCNC: 13.7 G/DL (ref 13–17.7)
IMM GRANULOCYTES # BLD AUTO: 0.05 10*3/MM3 (ref 0–0.05)
IMM GRANULOCYTES NFR BLD AUTO: 0.7 % (ref 0–0.5)
LYMPHOCYTES # BLD AUTO: 2.24 10*3/MM3 (ref 0.7–3.1)
LYMPHOCYTES NFR BLD AUTO: 31.9 % (ref 19.6–45.3)
MCH RBC QN AUTO: 29.1 PG (ref 26.6–33)
MCHC RBC AUTO-ENTMCNC: 30.7 G/DL (ref 31.5–35.7)
MCV RBC AUTO: 94.7 FL (ref 79–97)
MONOCYTES # BLD AUTO: 0.54 10*3/MM3 (ref 0.1–0.9)
MONOCYTES NFR BLD AUTO: 7.7 % (ref 5–12)
NEUTROPHILS # BLD AUTO: 4.03 10*3/MM3 (ref 1.7–7)
NEUTROPHILS NFR BLD AUTO: 57.4 % (ref 42.7–76)
NRBC BLD AUTO-RTO: 0 /100 WBC (ref 0–0.2)
PLATELET # BLD AUTO: 230 10*3/MM3 (ref 140–450)
PSA SERPL-MCNC: 0.64 NG/ML (ref 0–4)
RBC # BLD AUTO: 4.71 10*6/MM3 (ref 4.14–5.8)
TESTOST SERPL-MCNC: 426 NG/DL (ref 264–916)
WBC # BLD AUTO: 7.02 10*3/MM3 (ref 3.4–10.8)

## 2019-12-26 ENCOUNTER — OFFICE VISIT (OUTPATIENT)
Dept: FAMILY MEDICINE CLINIC | Facility: CLINIC | Age: 39
End: 2019-12-26

## 2019-12-26 VITALS
HEIGHT: 72 IN | RESPIRATION RATE: 20 BRPM | DIASTOLIC BLOOD PRESSURE: 80 MMHG | SYSTOLIC BLOOD PRESSURE: 120 MMHG | HEART RATE: 88 BPM | BODY MASS INDEX: 41.37 KG/M2 | WEIGHT: 305.4 LBS | TEMPERATURE: 97.2 F

## 2019-12-26 DIAGNOSIS — J18.9 WALKING PNEUMONIA: Primary | ICD-10-CM

## 2019-12-26 DIAGNOSIS — J18.9 WALKING PNEUMONIA: ICD-10-CM

## 2019-12-26 PROCEDURE — 99213 OFFICE O/P EST LOW 20 MIN: CPT | Performed by: NURSE PRACTITIONER

## 2019-12-26 RX ORDER — ALBUTEROL SULFATE 90 UG/1
AEROSOL, METERED RESPIRATORY (INHALATION)
Qty: 42.5 G | OUTPATIENT
Start: 2019-12-26

## 2019-12-26 RX ORDER — BENZONATATE 200 MG/1
200 CAPSULE ORAL 3 TIMES DAILY PRN
Qty: 30 CAPSULE | Refills: 0 | Status: SHIPPED | OUTPATIENT
Start: 2019-12-26 | End: 2020-01-22

## 2019-12-26 RX ORDER — ALBUTEROL SULFATE 90 UG/1
2 AEROSOL, METERED RESPIRATORY (INHALATION) EVERY 4 HOURS PRN
Qty: 1 INHALER | Refills: 0 | Status: SHIPPED | OUTPATIENT
Start: 2019-12-26 | End: 2020-09-14 | Stop reason: SDUPTHER

## 2019-12-26 RX ORDER — AZITHROMYCIN 250 MG/1
TABLET, FILM COATED ORAL
Qty: 6 TABLET | Refills: 0 | Status: SHIPPED | OUTPATIENT
Start: 2019-12-26 | End: 2020-01-22

## 2019-12-26 RX ORDER — METHYLPREDNISOLONE 4 MG/1
TABLET ORAL
Qty: 21 TABLET | Refills: 0 | Status: SHIPPED | OUTPATIENT
Start: 2019-12-26 | End: 2020-01-22

## 2019-12-26 NOTE — PROGRESS NOTES
Subjective   Gerson Simons is a 39 y.o. male.     History of Present Illness   Cough and chest congestion over the past 2 weeks  Cough worse over the past 4 days  Taking Mucinex DM   Productive cough with Green thick sputum. Having night sweats extreme fatigue   Wheezing tightness and rattling in chest   Worried that he has pneumonia or bronchitis  Boss wanted him to stay home the rest of the week to get better      The following portions of the patient's history were reviewed and updated as appropriate: allergies, current medications, past family history, past medical history, past social history, past surgical history and problem list.    Review of Systems   Constitutional: Positive for activity change, diaphoresis (night sweats), fatigue and fever. Negative for appetite change and chills.   HENT: Negative for congestion, ear pain, postnasal drip, rhinorrhea, sinus pressure, sneezing, sore throat, swollen glands, trouble swallowing and voice change.    Eyes: Negative for redness and itching.   Respiratory: Positive for cough, chest tightness, shortness of breath and wheezing.    Cardiovascular: Negative.  Negative for chest pain and palpitations.   Gastrointestinal: Negative.  Negative for abdominal pain, nausea and vomiting.   Endocrine: Negative.    Genitourinary: Negative.    Musculoskeletal: Negative.  Negative for arthralgias, myalgias, neck pain and neck stiffness.   Skin: Negative.  Negative for rash and skin lesions.   Allergic/Immunologic: Negative.  Negative for environmental allergies.   Neurological: Negative for dizziness, weakness, light-headedness and headache.   Hematological: Negative for adenopathy.   Psychiatric/Behavioral: Positive for sleep disturbance (due to cough).       Objective   Physical Exam   Constitutional: He is oriented to person, place, and time. He appears well-developed and well-nourished. He has a sickly appearance. No distress.   HENT:   Head: Normocephalic.   Right Ear:  External ear and ear canal normal. Tympanic membrane is bulging. Tympanic membrane is not erythematous.   Left Ear: Tympanic membrane, external ear and ear canal normal. Tympanic membrane is not erythematous and not bulging.   Nose: No mucosal edema, rhinorrhea or congestion. Right sinus exhibits no maxillary sinus tenderness and no frontal sinus tenderness. Left sinus exhibits no maxillary sinus tenderness and no frontal sinus tenderness.   Mouth/Throat: Uvula is midline, oropharynx is clear and moist and mucous membranes are normal. No posterior oropharyngeal edema or posterior oropharyngeal erythema.   Eyes: Pupils are equal, round, and reactive to light. Conjunctivae and lids are normal.   Neck: Neck supple.   Cardiovascular: Normal rate, regular rhythm, S1 normal, S2 normal and normal heart sounds.   Pulmonary/Chest: Effort normal and breath sounds normal. No stridor. No respiratory distress. He has no decreased breath sounds. He has no wheezes. He has no rhonchi. He has no rales. He exhibits no tenderness.   Wet juicy cough, green sputum, no wheezing or distress noted.   Abdominal: Soft. Normal appearance and bowel sounds are normal. There is no tenderness.   Lymphadenopathy:        Head (right side): No tonsillar, no preauricular, no posterior auricular and no occipital adenopathy present.        Head (left side): No tonsillar, no preauricular, no posterior auricular and no occipital adenopathy present.     He has no cervical adenopathy.   Neurological: He is alert and oriented to person, place, and time.   Skin: Skin is warm, dry and intact. Capillary refill takes less than 2 seconds. Turgor is normal. No rash noted. He is not diaphoretic. No cyanosis. No pallor.   Psychiatric: He has a normal mood and affect. His behavior is normal. Judgment and thought content normal. Cognition and memory are normal.   Nursing note and vitals reviewed.        Assessment/Plan   Gerson was seen today for cough, chest  congestion & soa.    Diagnoses and all orders for this visit:    Walking pneumonia  -     azithromycin (ZITHROMAX) 250 MG tablet; Take 2 tablets the first day, then 1 tablet daily for 4 days.  -     methylPREDNISolone (MEDROL, KEARA,) 4 MG tablet; Take as directed on package instructions.  -     albuterol sulfate  (90 Base) MCG/ACT inhaler; Inhale 2 puffs Every 4 (Four) Hours As Needed for Wheezing.  -     benzonatate (TESSALON) 200 MG capsule; Take 1 capsule by mouth 3 (Three) Times a Day As Needed for Cough.      Will treat pt for walking pneumonia, if not improving pt advised to make follow up to have labs and CXR. Pt agrees.   Rest and fluids, off work the rest of the week

## 2020-03-16 ENCOUNTER — OFFICE VISIT (OUTPATIENT)
Dept: FAMILY MEDICINE CLINIC | Facility: CLINIC | Age: 40
End: 2020-03-16

## 2020-03-16 VITALS
TEMPERATURE: 97.8 F | RESPIRATION RATE: 18 BRPM | BODY MASS INDEX: 41.31 KG/M2 | HEART RATE: 80 BPM | HEIGHT: 72 IN | WEIGHT: 305 LBS | SYSTOLIC BLOOD PRESSURE: 120 MMHG | DIASTOLIC BLOOD PRESSURE: 92 MMHG

## 2020-03-16 DIAGNOSIS — E29.1 HYPOGONADISM IN MALE: Primary | ICD-10-CM

## 2020-03-16 DIAGNOSIS — E78.2 MIXED HYPERLIPIDEMIA: ICD-10-CM

## 2020-03-16 DIAGNOSIS — F33.0 MILD EPISODE OF RECURRENT MAJOR DEPRESSIVE DISORDER (HCC): ICD-10-CM

## 2020-03-16 DIAGNOSIS — F41.9 ANXIETY: ICD-10-CM

## 2020-03-16 DIAGNOSIS — J30.2 SEASONAL ALLERGIES: ICD-10-CM

## 2020-03-16 DIAGNOSIS — G43.009 MIGRAINE WITHOUT AURA AND WITHOUT STATUS MIGRAINOSUS, NOT INTRACTABLE: ICD-10-CM

## 2020-03-16 DIAGNOSIS — R45.4 IRRITABILITY: ICD-10-CM

## 2020-03-16 PROCEDURE — 99214 OFFICE O/P EST MOD 30 MIN: CPT | Performed by: FAMILY MEDICINE

## 2020-03-16 RX ORDER — VENLAFAXINE HYDROCHLORIDE 75 MG/1
75 CAPSULE, EXTENDED RELEASE ORAL DAILY
Qty: 90 CAPSULE | Refills: 1 | Status: SHIPPED | OUTPATIENT
Start: 2020-03-16 | End: 2020-08-18

## 2020-03-16 RX ORDER — SUMATRIPTAN 100 MG/1
TABLET, FILM COATED ORAL
Qty: 9 TABLET | Refills: 5 | Status: SHIPPED | OUTPATIENT
Start: 2020-03-16 | End: 2020-10-09 | Stop reason: SDUPTHER

## 2020-03-16 RX ORDER — VENLAFAXINE HYDROCHLORIDE 150 MG/1
CAPSULE, EXTENDED RELEASE ORAL
Qty: 90 CAPSULE | Refills: 1 | Status: SHIPPED | OUTPATIENT
Start: 2020-03-16 | End: 2020-08-18

## 2020-03-16 RX ORDER — TESTOSTERONE 16.2 MG/G
GEL TRANSDERMAL
Qty: 75 G | Refills: 5 | Status: SHIPPED | OUTPATIENT
Start: 2020-03-16 | End: 2020-10-09 | Stop reason: SDUPTHER

## 2020-03-16 RX ORDER — LORATADINE 10 MG/1
10 TABLET ORAL DAILY
Qty: 30 TABLET | Refills: 5 | Status: SHIPPED | OUTPATIENT
Start: 2020-03-16 | End: 2020-10-09 | Stop reason: SDUPTHER

## 2020-03-16 NOTE — PROGRESS NOTES
Subjective   Gerson Simons is a 39 y.o. male.     History of Present Illness     He notes he has had migraines for a long time  He has used OTC medicine, exedrin used to work  However now the HA are lasting a couple days  He gets these about 3 times a month  Had photophobia with this last HA which he has with bad MILNER    Mood is doing great on effexor 225  No complaints and no SE as long as he has these meds  Has noticed a nice improvement with this medicine    Testosterone has been helping quite a bit  He is on the gel  He notes energy, mood is much better since strting it  Better libido as well    The following portions of the patient's history were reviewed and updated as appropriate: allergies, current medications, past family history, past medical history, past social history, past surgical history and problem list.    Review of Systems   Constitutional: Negative.    Eyes: Positive for photophobia.   Respiratory: Negative.  Negative for shortness of breath.    Cardiovascular: Negative.  Negative for chest pain.   Gastrointestinal: Negative.    Musculoskeletal: Negative.    Skin: Negative.    Neurological: Positive for headaches.   Psychiatric/Behavioral: Negative.  Negative for dysphoric mood. The patient is not nervous/anxious.    All other systems reviewed and are negative.      Objective   Physical Exam   Constitutional: He appears well-developed and well-nourished. No distress.   Cardiovascular: Normal rate, regular rhythm and normal heart sounds.   Pulmonary/Chest: Effort normal and breath sounds normal.   Psychiatric: He has a normal mood and affect. His behavior is normal.   Nursing note and vitals reviewed.      Assessment/Plan   Diagnoses and all orders for this visit:    Hypogonadism in male  -     Testosterone 20.25 MG/ACT (1.62%) gel; 2 pumps daily, apply as directed  -     CBC & Differential  -     Testosterone  -     PSA Screen    Anxiety  -     venlafaxine XR (EFFEXOR XR) 75 MG 24 hr capsule;  Take 1 capsule by mouth Daily. Take with 150 mg for total dose of 225 mg daily  -     venlafaxine XR (EFFEXOR-XR) 150 MG 24 hr capsule; TAKE 1 CAPSULE BY MOUTH DAILY    Mild episode of recurrent major depressive disorder (CMS/HCC)  -     venlafaxine XR (EFFEXOR XR) 75 MG 24 hr capsule; Take 1 capsule by mouth Daily. Take with 150 mg for total dose of 225 mg daily  -     venlafaxine XR (EFFEXOR-XR) 150 MG 24 hr capsule; TAKE 1 CAPSULE BY MOUTH DAILY    Irritability  -     venlafaxine XR (EFFEXOR XR) 75 MG 24 hr capsule; Take 1 capsule by mouth Daily. Take with 150 mg for total dose of 225 mg daily    Mixed hyperlipidemia  -     CBC & Differential  -     Comprehensive Metabolic Panel  -     Lipid Panel    Migraine without aura and without status migrainosus, not intractable  -     SUMAtriptan (IMITREX) 100 MG tablet; Take one tablet at onset of headache. May repeat dose one time in 2 hours if headache not relieved.    Seasonal allergies  -     loratadine (CLARITIN) 10 MG tablet; Take 1 tablet by mouth Daily.    will recheck testosterone levels and continue medicine  Mood doing great with effexor, refilled  Discussed pros/con/se and SE of medicine and will use PRN imitrex for migraines.  He will call back INB

## 2020-08-18 DIAGNOSIS — R45.4 IRRITABILITY: ICD-10-CM

## 2020-08-18 DIAGNOSIS — F41.9 ANXIETY: ICD-10-CM

## 2020-08-18 DIAGNOSIS — F33.0 MILD EPISODE OF RECURRENT MAJOR DEPRESSIVE DISORDER (HCC): ICD-10-CM

## 2020-08-18 RX ORDER — VENLAFAXINE HYDROCHLORIDE 75 MG/1
75 CAPSULE, EXTENDED RELEASE ORAL DAILY
Qty: 30 CAPSULE | Refills: 0 | Status: SHIPPED | OUTPATIENT
Start: 2020-08-18 | End: 2020-10-09 | Stop reason: SDUPTHER

## 2020-08-18 RX ORDER — VENLAFAXINE HYDROCHLORIDE 150 MG/1
CAPSULE, EXTENDED RELEASE ORAL
Qty: 30 CAPSULE | Refills: 0 | Status: SHIPPED | OUTPATIENT
Start: 2020-08-18 | End: 2020-10-09 | Stop reason: SDUPTHER

## 2020-09-14 ENCOUNTER — TELEMEDICINE (OUTPATIENT)
Dept: FAMILY MEDICINE CLINIC | Facility: CLINIC | Age: 40
End: 2020-09-14

## 2020-09-14 VITALS — TEMPERATURE: 98.7 F

## 2020-09-14 DIAGNOSIS — J20.9 ACUTE BRONCHITIS, UNSPECIFIED ORGANISM: Primary | ICD-10-CM

## 2020-09-14 DIAGNOSIS — R05.9 COUGH: ICD-10-CM

## 2020-09-14 PROCEDURE — 99213 OFFICE O/P EST LOW 20 MIN: CPT | Performed by: PHYSICIAN ASSISTANT

## 2020-09-14 RX ORDER — METHYLPREDNISOLONE 4 MG/1
TABLET ORAL
Qty: 21 EACH | Refills: 0 | Status: SHIPPED | OUTPATIENT
Start: 2020-09-14 | End: 2020-10-09

## 2020-09-14 RX ORDER — ALBUTEROL SULFATE 90 UG/1
2 AEROSOL, METERED RESPIRATORY (INHALATION) EVERY 4 HOURS PRN
Qty: 18 G | Refills: 5 | Status: SHIPPED | OUTPATIENT
Start: 2020-09-14 | End: 2020-10-09

## 2020-09-14 RX ORDER — AZITHROMYCIN 250 MG/1
TABLET, FILM COATED ORAL
Qty: 6 TABLET | Refills: 0 | Status: SHIPPED | OUTPATIENT
Start: 2020-09-14 | End: 2020-10-09

## 2020-09-14 RX ORDER — BENZONATATE 200 MG/1
200 CAPSULE ORAL 3 TIMES DAILY PRN
Qty: 30 CAPSULE | Refills: 0 | Status: SHIPPED | OUTPATIENT
Start: 2020-09-14 | End: 2020-10-09

## 2020-09-14 NOTE — PROGRESS NOTES
Subjective   Gerson Simons is a 39 y.o. male.    video visit  You have chosen to receive care through a telehealth visit.  Do you consent to use a video/audio connection for your medical care today? Yes    History of Present Illness   Pt presents for video visit with CC of productive cough (brown sputum), sore throat, chest congestion, sinus pressure, HA, and PND. Symptoms started last week. Slowly progressing. Gets theses symptoms once or twice per year. Does have hx of pneumonia.   No sudden loss of taste or smell. Off work right now. 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 Is having some SOB   Using mucinex  And albuterol inhaler with some relief   Taking allergy medication   Does not smoke  Pt does have hx of asthma   No concerns for exposure to covid  No recent travel      The following portions of the patient's history were reviewed and updated as appropriate: allergies, current medications, past family history, past medical history, past social history, past surgical history and problem list.    Review of Systems   Constitutional: Positive for fatigue. Negative for chills, diaphoresis and fever.   HENT: Positive for congestion, postnasal drip, sinus pressure and sore throat. Negative for ear discharge, ear pain, hearing loss, nosebleeds, sneezing and trouble swallowing.    Eyes: Negative.    Respiratory: Positive for cough, shortness of breath and wheezing. Negative for chest tightness.    Cardiovascular: Negative for chest pain.   Gastrointestinal: Negative for abdominal pain, constipation, diarrhea, nausea and vomiting.   Musculoskeletal: Negative for myalgias.   Skin: Negative for rash.   Neurological: Positive for headaches. Negative for dizziness, syncope, weakness, light-headedness and numbness.       Objective    Temperature 98.7 °F (37.1 °C).     Physical Exam  Constitutional:       General: He is not in acute distress.     Appearance:  Normal appearance. He is not ill-appearing or toxic-appearing.   Pulmonary:      Effort: Pulmonary effort is normal.      Comments: Hacking cough noted  Neurological:      Mental Status: He is alert and oriented to person, place, and time.   Psychiatric:         Mood and Affect: Mood normal.         Behavior: Behavior normal.         Thought Content: Thought content normal.         Judgment: Judgment normal.     additional exam unable to obtain due to video visit     Assessment/Plan   Gerson was seen today for cough.    Diagnoses and all orders for this visit:    Acute bronchitis, unspecified organism  -     azithromycin (Zithromax Z-Reji) 250 MG tablet; Take 2 tablets the first day, then 1 tablet daily for 4 days.  -     methylPREDNISolone (MEDROL) 4 MG dose pack; Take as directed on package instructions.  -     albuterol sulfate  (90 Base) MCG/ACT inhaler; Inhale 2 puffs Every 4 (Four) Hours As Needed for Wheezing.    Cough  -     benzonatate (TESSALON) 200 MG capsule; Take 1 capsule by mouth 3 (Three) Times a Day As Needed for Cough.    treatment as outlined in plan. Rest, fluids and limit contact with others. Encourage COVID 19 testing if new or worsening symptoms develop of if symptoms do not improve with his standard treatment of annual bronchitis. Pt agrees.       This visit has been scheduled as a video visit to comply with patient safety concerns in accordance with CDC recommendations. Total time of discussion was 10 minutes.

## 2020-10-09 ENCOUNTER — OFFICE VISIT (OUTPATIENT)
Dept: FAMILY MEDICINE CLINIC | Facility: CLINIC | Age: 40
End: 2020-10-09

## 2020-10-09 VITALS
RESPIRATION RATE: 18 BRPM | HEIGHT: 72 IN | DIASTOLIC BLOOD PRESSURE: 84 MMHG | SYSTOLIC BLOOD PRESSURE: 126 MMHG | HEART RATE: 76 BPM | WEIGHT: 296 LBS | TEMPERATURE: 98.7 F | BODY MASS INDEX: 40.09 KG/M2

## 2020-10-09 DIAGNOSIS — E29.1 HYPOGONADISM IN MALE: ICD-10-CM

## 2020-10-09 DIAGNOSIS — F41.9 ANXIETY: ICD-10-CM

## 2020-10-09 DIAGNOSIS — Z83.42 FAMILY HISTORY OF HYPERCHOLESTEROLEMIA: ICD-10-CM

## 2020-10-09 DIAGNOSIS — J30.2 SEASONAL ALLERGIES: ICD-10-CM

## 2020-10-09 DIAGNOSIS — R45.4 IRRITABILITY: ICD-10-CM

## 2020-10-09 DIAGNOSIS — F33.0 MILD EPISODE OF RECURRENT MAJOR DEPRESSIVE DISORDER (HCC): ICD-10-CM

## 2020-10-09 DIAGNOSIS — G43.009 MIGRAINE WITHOUT AURA AND WITHOUT STATUS MIGRAINOSUS, NOT INTRACTABLE: ICD-10-CM

## 2020-10-09 DIAGNOSIS — Z11.59 ENCOUNTER FOR HEPATITIS C SCREENING TEST FOR LOW RISK PATIENT: Primary | ICD-10-CM

## 2020-10-09 PROCEDURE — 99214 OFFICE O/P EST MOD 30 MIN: CPT | Performed by: FAMILY MEDICINE

## 2020-10-09 PROCEDURE — 3008F BODY MASS INDEX DOCD: CPT | Performed by: FAMILY MEDICINE

## 2020-10-09 PROCEDURE — G8417 CALC BMI ABV UP PARAM F/U: HCPCS | Performed by: FAMILY MEDICINE

## 2020-10-09 RX ORDER — VENLAFAXINE HYDROCHLORIDE 150 MG/1
150 CAPSULE, EXTENDED RELEASE ORAL DAILY
Qty: 90 CAPSULE | Refills: 1 | Status: SHIPPED | OUTPATIENT
Start: 2020-10-09

## 2020-10-09 RX ORDER — LORATADINE 10 MG/1
10 TABLET ORAL DAILY
Qty: 90 TABLET | Refills: 1 | Status: SHIPPED | OUTPATIENT
Start: 2020-10-09

## 2020-10-09 RX ORDER — SUMATRIPTAN 100 MG/1
TABLET, FILM COATED ORAL
Qty: 27 TABLET | Refills: 1 | Status: SHIPPED | OUTPATIENT
Start: 2020-10-09

## 2020-10-09 RX ORDER — TESTOSTERONE 16.2 MG/G
GEL TRANSDERMAL
Qty: 75 G | Refills: 5 | Status: SHIPPED | OUTPATIENT
Start: 2020-10-09

## 2020-10-09 RX ORDER — VENLAFAXINE HYDROCHLORIDE 75 MG/1
75 CAPSULE, EXTENDED RELEASE ORAL DAILY
Qty: 90 CAPSULE | Refills: 1 | Status: SHIPPED | OUTPATIENT
Start: 2020-10-09

## 2020-10-09 NOTE — PROGRESS NOTES
Subjective   Gerson Simons is a 40 y.o. male.     History of Present Illness     Mood has been doing well with the effexor and the 225 mg dose is better than 150  No SE and he wants to stay on this      He does note an improvement with the testosterone replacement  Better energy, better libido and mood is better with this  Just feels better    He has imitrex for his migraines  He gets these sporadically and the medicine works great to get rid of his migrains  He does not have an aura  Would like refills available for imitrex    The claritin helps his allergies  They have been a little flared up the last few weeks    The following portions of the patient's history were reviewed and updated as appropriate: allergies, current medications, past family history, past medical history, past social history, past surgical history and problem list.    Review of Systems   Constitutional: Negative.    HENT: Negative.    Eyes: Negative.    Respiratory: Negative.  Negative for shortness of breath.    Cardiovascular: Negative.  Negative for chest pain.   Gastrointestinal: Negative.  Negative for constipation and diarrhea.   Musculoskeletal: Negative.    Skin: Negative.    Neurological: Negative.    Psychiatric/Behavioral: Negative.  Negative for dysphoric mood. The patient is not nervous/anxious.    All other systems reviewed and are negative.      Objective   Physical Exam  Vitals signs and nursing note reviewed.   Constitutional:       General: He is not in acute distress.     Appearance: Normal appearance. He is well-developed.   Cardiovascular:      Rate and Rhythm: Normal rate and regular rhythm.      Heart sounds: Normal heart sounds.   Pulmonary:      Effort: Pulmonary effort is normal.      Breath sounds: Normal breath sounds.   Abdominal:      General: Abdomen is flat. Bowel sounds are normal. There is no distension.      Palpations: Abdomen is soft.      Tenderness: There is no abdominal tenderness.   Neurological:       Mental Status: He is alert and oriented to person, place, and time.   Psychiatric:         Mood and Affect: Mood normal.         Behavior: Behavior normal.         Thought Content: Thought content normal.         Judgment: Judgment normal.         Assessment/Plan   Gerson was seen today for depression and hypothyroidism.    Diagnoses and all orders for this visit:    Encounter for hepatitis C screening test for low risk patient  -     Hepatitis C Antibody    Anxiety  -     venlafaxine XR (EFFEXOR-XR) 75 MG 24 hr capsule; Take 1 capsule by mouth Daily.  -     venlafaxine XR (EFFEXOR-XR) 150 MG 24 hr capsule; Take 1 capsule by mouth Daily.    Mild episode of recurrent major depressive disorder (CMS/HCC)  -     venlafaxine XR (EFFEXOR-XR) 75 MG 24 hr capsule; Take 1 capsule by mouth Daily.  -     venlafaxine XR (EFFEXOR-XR) 150 MG 24 hr capsule; Take 1 capsule by mouth Daily.    Irritability  -     venlafaxine XR (EFFEXOR-XR) 75 MG 24 hr capsule; Take 1 capsule by mouth Daily.    Hypogonadism in male  -     Testosterone 20.25 MG/ACT (1.62%) gel; 2 pumps daily, apply as directed  -     CBC & Differential  -     Testosterone    Migraine without aura and without status migrainosus, not intractable  -     SUMAtriptan (Imitrex) 100 MG tablet; Take one tablet at onset of headache. May repeat dose one time in 2 hours if headache not relieved.    Seasonal allergies  -     loratadine (Claritin) 10 MG tablet; Take 1 tablet by mouth Daily.    Family history of hypercholesterolemia  -     CBC & Differential  -     Comprehensive Metabolic Panel  -     Lipid Panel    mood is doing well with effexor  He is happy with regimen and will continue this med.  Ok PRN imitrex for migraines, excellent control and used as needed  Continue T replacement and levels to be checked today  claritin for allergies, doing great with this

## 2020-10-10 LAB
ALBUMIN SERPL-MCNC: 4.6 G/DL (ref 4–5)
ALBUMIN/GLOB SERPL: 1.7 {RATIO} (ref 1.2–2.2)
ALP SERPL-CCNC: 83 IU/L (ref 39–117)
ALT SERPL-CCNC: 42 IU/L (ref 0–44)
AST SERPL-CCNC: 34 IU/L (ref 0–40)
BASOPHILS # BLD AUTO: 0.1 X10E3/UL (ref 0–0.2)
BASOPHILS NFR BLD AUTO: 1 %
BILIRUB SERPL-MCNC: 0.4 MG/DL (ref 0–1.2)
BUN SERPL-MCNC: 13 MG/DL (ref 6–24)
BUN/CREAT SERPL: 14 (ref 9–20)
CALCIUM SERPL-MCNC: 9.4 MG/DL (ref 8.7–10.2)
CHLORIDE SERPL-SCNC: 103 MMOL/L (ref 96–106)
CHOLEST SERPL-MCNC: 210 MG/DL (ref 100–199)
CO2 SERPL-SCNC: 26 MMOL/L (ref 20–29)
CREAT SERPL-MCNC: 0.91 MG/DL (ref 0.76–1.27)
EOSINOPHIL # BLD AUTO: 0.1 X10E3/UL (ref 0–0.4)
EOSINOPHIL NFR BLD AUTO: 2 %
ERYTHROCYTE [DISTWIDTH] IN BLOOD BY AUTOMATED COUNT: 12.6 % (ref 11.6–15.4)
GLOBULIN SER CALC-MCNC: 2.7 G/DL (ref 1.5–4.5)
GLUCOSE SERPL-MCNC: 88 MG/DL (ref 65–99)
HCT VFR BLD AUTO: 45.3 % (ref 37.5–51)
HCV AB S/CO SERPL IA: <0.1 S/CO RATIO (ref 0–0.9)
HDLC SERPL-MCNC: 41 MG/DL
HGB BLD-MCNC: 15.7 G/DL (ref 13–17.7)
IMM GRANULOCYTES # BLD AUTO: 0 X10E3/UL (ref 0–0.1)
IMM GRANULOCYTES NFR BLD AUTO: 1 %
LDLC SERPL CALC-MCNC: 117 MG/DL (ref 0–99)
LYMPHOCYTES # BLD AUTO: 2.4 X10E3/UL (ref 0.7–3.1)
LYMPHOCYTES NFR BLD AUTO: 31 %
MCH RBC QN AUTO: 30.2 PG (ref 26.6–33)
MCHC RBC AUTO-ENTMCNC: 34.7 G/DL (ref 31.5–35.7)
MCV RBC AUTO: 87 FL (ref 79–97)
MONOCYTES # BLD AUTO: 0.5 X10E3/UL (ref 0.1–0.9)
MONOCYTES NFR BLD AUTO: 7 %
NEUTROPHILS # BLD AUTO: 4.7 X10E3/UL (ref 1.4–7)
NEUTROPHILS NFR BLD AUTO: 58 %
PLATELET # BLD AUTO: 255 X10E3/UL (ref 150–450)
POTASSIUM SERPL-SCNC: 4.7 MMOL/L (ref 3.5–5.2)
PROT SERPL-MCNC: 7.3 G/DL (ref 6–8.5)
RBC # BLD AUTO: 5.2 X10E6/UL (ref 4.14–5.8)
SODIUM SERPL-SCNC: 144 MMOL/L (ref 134–144)
TESTOST SERPL-MCNC: 112 NG/DL (ref 264–916)
TRIGL SERPL-MCNC: 301 MG/DL (ref 0–149)
VLDLC SERPL CALC-MCNC: 52 MG/DL (ref 5–40)
WBC # BLD AUTO: 7.9 X10E3/UL (ref 3.4–10.8)

## 2021-10-24 DIAGNOSIS — R45.4 IRRITABILITY: ICD-10-CM

## 2021-10-24 DIAGNOSIS — F41.9 ANXIETY: ICD-10-CM

## 2021-10-24 DIAGNOSIS — F33.0 MILD EPISODE OF RECURRENT MAJOR DEPRESSIVE DISORDER (HCC): ICD-10-CM

## 2021-10-25 RX ORDER — VENLAFAXINE HYDROCHLORIDE 75 MG/1
75 CAPSULE, EXTENDED RELEASE ORAL DAILY
Qty: 90 CAPSULE | Refills: 1 | OUTPATIENT
Start: 2021-10-25

## 2023-04-19 ENCOUNTER — OFFICE VISIT (OUTPATIENT)
Dept: FAMILY MEDICINE CLINIC | Facility: CLINIC | Age: 43
End: 2023-04-19
Payer: COMMERCIAL

## 2023-04-19 VITALS
HEIGHT: 72 IN | HEART RATE: 76 BPM | BODY MASS INDEX: 38.87 KG/M2 | DIASTOLIC BLOOD PRESSURE: 80 MMHG | RESPIRATION RATE: 18 BRPM | TEMPERATURE: 97.5 F | SYSTOLIC BLOOD PRESSURE: 118 MMHG | WEIGHT: 287 LBS

## 2023-04-19 DIAGNOSIS — F43.10 PTSD (POST-TRAUMATIC STRESS DISORDER): Primary | ICD-10-CM

## 2023-04-19 DIAGNOSIS — F33.0 MILD EPISODE OF RECURRENT MAJOR DEPRESSIVE DISORDER: ICD-10-CM

## 2023-04-19 DIAGNOSIS — E29.1 HYPOGONADISM IN MALE: ICD-10-CM

## 2023-04-19 DIAGNOSIS — F41.9 ANXIETY: ICD-10-CM

## 2023-04-19 DIAGNOSIS — E66.01 MORBID (SEVERE) OBESITY DUE TO EXCESS CALORIES: ICD-10-CM

## 2023-04-19 DIAGNOSIS — R73.9 HYPERGLYCEMIA: ICD-10-CM

## 2023-04-19 RX ORDER — LAMOTRIGINE 25 MG/1
TABLET ORAL
Qty: 120 TABLET | Refills: 1 | Status: SHIPPED | OUTPATIENT
Start: 2023-04-19

## 2023-04-19 NOTE — PROGRESS NOTES
Subjective   Gerson Simons is a 42 y.o. male.     History of Present Illness     [facility]  [facility] and it is [facility]    He was on effexor and then stopped it due to weight gain, hot flashes, HA, etc.  Mood has been poor overall  He has been very anxious, feels down all the time, and has a history of PTSD      He had diagnosis of prediabetes  He started to exercise and eat healthier  He is worried about his weight and prediabetes    Would like something to help his weight      The following portions of the patient's history were reviewed and updated as appropriate: allergies, current medications, past family history, past medical history, past social history, past surgical history and problem list.    Review of Systems   Constitutional: Negative.    Psychiatric/Behavioral: Positive for dysphoric mood. The patient is nervous/anxious.        Objective   Physical Exam  Vitals and nursing note reviewed.   Constitutional:       General: He is not in acute distress.     Appearance: Normal appearance. He is well-developed.   Cardiovascular:      Rate and Rhythm: Normal rate and regular rhythm.      Heart sounds: Normal heart sounds.   Pulmonary:      Effort: Pulmonary effort is normal.      Breath sounds: Normal breath sounds.   Neurological:      Mental Status: He is alert and oriented to person, place, and time.   Psychiatric:         Mood and Affect: Mood normal.         Behavior: Behavior normal.         Thought Content: Thought content normal.         Judgment: Judgment normal.         Assessment & Plan   Diagnoses and all orders for this visit:    1. PTSD (post-traumatic stress disorder) (Primary)  -     lamoTRIgine (LaMICtal) 25 MG tablet; 1 PO QD 1 week then 1 PO BID 1 week then 2 po QAM and 1 po QHS 1 WEEK then 2 po bid  Dispense: 120 tablet; Refill: 1  -     Ambulatory Referral to Behavioral Health    2. Mild episode of recurrent major depressive disorder  -      lamoTRIgine (LaMICtal) 25 MG tablet; 1 PO QD 1 week then 1 PO BID 1 week then 2 po QAM and 1 po QHS 1 WEEK then 2 po bid  Dispense: 120 tablet; Refill: 1  -     Ambulatory Referral to Behavioral Health    3. Anxiety  -     lamoTRIgine (LaMICtal) 25 MG tablet; 1 PO QD 1 week then 1 PO BID 1 week then 2 po QAM and 1 po QHS 1 WEEK then 2 po bid  Dispense: 120 tablet; Refill: 1  -     Ambulatory Referral to Behavioral Health    4. Hyperglycemia  -     Hemoglobin A1c  -     CBC & Differential  -     Comprehensive Metabolic Panel    5. Morbid (severe) obesity due to excess calories  -     Ambulatory Referral to Nutrition Services    6. Hypogonadism in male  -     Testosterone  -     PSA DIAGNOSTIC    will star lamictal for mood issues and work on referral to Dilip for further issues.  effexor did work in past, would consider resuming this but get mental health involved at this time since mood is worse.    Will recheck DM labs as he had been prediabetic in the past.  Discussed ozempic or mounjaro to help with DM and weight as well as wegovy if he does notn have DM as pt wants help losing weight.  Will f/u pending labs    [facility]    Would consider wegovy for weight loss

## 2023-04-20 LAB
ALBUMIN SERPL-MCNC: 4.8 G/DL (ref 4–5)
ALBUMIN/GLOB SERPL: 1.8 {RATIO} (ref 1.2–2.2)
ALP SERPL-CCNC: 83 IU/L (ref 44–121)
ALT SERPL-CCNC: 23 IU/L (ref 0–44)
AST SERPL-CCNC: 20 IU/L (ref 0–40)
BASOPHILS # BLD AUTO: 0.1 X10E3/UL (ref 0–0.2)
BASOPHILS NFR BLD AUTO: 1 %
BILIRUB SERPL-MCNC: 0.3 MG/DL (ref 0–1.2)
BUN SERPL-MCNC: 14 MG/DL (ref 6–24)
BUN/CREAT SERPL: 14 (ref 9–20)
CALCIUM SERPL-MCNC: 9.8 MG/DL (ref 8.7–10.2)
CHLORIDE SERPL-SCNC: 103 MMOL/L (ref 96–106)
CO2 SERPL-SCNC: 24 MMOL/L (ref 20–29)
CREAT SERPL-MCNC: 0.98 MG/DL (ref 0.76–1.27)
EGFRCR SERPLBLD CKD-EPI 2021: 99 ML/MIN/1.73
EOSINOPHIL # BLD AUTO: 0.1 X10E3/UL (ref 0–0.4)
EOSINOPHIL NFR BLD AUTO: 1 %
ERYTHROCYTE [DISTWIDTH] IN BLOOD BY AUTOMATED COUNT: 12.1 % (ref 11.6–15.4)
GLOBULIN SER CALC-MCNC: 2.6 G/DL (ref 1.5–4.5)
GLUCOSE SERPL-MCNC: 81 MG/DL (ref 70–99)
HBA1C MFR BLD: 5.6 % (ref 4.8–5.6)
HCT VFR BLD AUTO: 49.5 % (ref 37.5–51)
HGB BLD-MCNC: 17.1 G/DL (ref 13–17.7)
IMM GRANULOCYTES # BLD AUTO: 0 X10E3/UL (ref 0–0.1)
IMM GRANULOCYTES NFR BLD AUTO: 0 %
LYMPHOCYTES # BLD AUTO: 2.6 X10E3/UL (ref 0.7–3.1)
LYMPHOCYTES NFR BLD AUTO: 27 %
MCH RBC QN AUTO: 29.2 PG (ref 26.6–33)
MCHC RBC AUTO-ENTMCNC: 34.5 G/DL (ref 31.5–35.7)
MCV RBC AUTO: 85 FL (ref 79–97)
MONOCYTES # BLD AUTO: 0.6 X10E3/UL (ref 0.1–0.9)
MONOCYTES NFR BLD AUTO: 6 %
NEUTROPHILS # BLD AUTO: 6.2 X10E3/UL (ref 1.4–7)
NEUTROPHILS NFR BLD AUTO: 65 %
PLATELET # BLD AUTO: 270 X10E3/UL (ref 150–450)
POTASSIUM SERPL-SCNC: 4.6 MMOL/L (ref 3.5–5.2)
PROT SERPL-MCNC: 7.4 G/DL (ref 6–8.5)
PSA SERPL-MCNC: 0.8 NG/ML (ref 0–4)
RBC # BLD AUTO: 5.85 X10E6/UL (ref 4.14–5.8)
SODIUM SERPL-SCNC: 141 MMOL/L (ref 134–144)
TESTOST SERPL-MCNC: 473 NG/DL (ref 264–916)
WBC # BLD AUTO: 9.5 X10E3/UL (ref 3.4–10.8)

## 2023-04-24 ENCOUNTER — TELEPHONE (OUTPATIENT)
Dept: FAMILY MEDICINE CLINIC | Facility: CLINIC | Age: 43
End: 2023-04-24
Payer: COMMERCIAL

## 2023-04-24 NOTE — TELEPHONE ENCOUNTER
Insurance denied ozempic, I did send in wegovy but I am concerned that insurance is not going to cover this medicine as we discussed at his appointment.

## 2023-04-24 NOTE — TELEPHONE ENCOUNTER
----- Message from Gerson Simons sent at 4/22/2023  7:06 AM EDT -----  Regarding: Medication Question we talked about  Contact: [phone]  Dr. Coates, good morning.  I was just wanting to know the next steps in possibly getting some medication help with losing this weight we had discussed during my visit.  Thank you for your time.  Gerson

## 2023-04-25 ENCOUNTER — TELEPHONE (OUTPATIENT)
Dept: FAMILY MEDICINE CLINIC | Facility: CLINIC | Age: 43
End: 2023-04-25
Payer: COMMERCIAL

## 2023-04-25 DIAGNOSIS — E66.01 MORBID (SEVERE) OBESITY DUE TO EXCESS CALORIES: Primary | ICD-10-CM

## 2023-04-25 NOTE — TELEPHONE ENCOUNTER
Caller: Gerson Simons    Relationship: Self    Best call back number: [phone]    What medication are you requesting: WEGOVY   What are your current symptoms: FOR WEIGHT LOSS MANAGEMENT     How long have you been experiencing symptoms: 5 YEARS     Have you had these symptoms before:    [x] Yes  [] No    Have you been treated for these symptoms before:   [x] Yes  [] No    If a prescription is needed, what is your preferred pharmacy and phone number: [facility] [address] [place] [address] [phone] [facility] [phone]      Additional notes:    THE PATIENT REPORTS THAT AT HIS LAST APPOINTMENT, HE DISCUSSED THE POSSIBILITY OF GETTING ON A WEIGHT LOSS MEDICATION AND BELIEVED THE NAME WAS WEGOVY. THE PATIENT STATES THAT IF DOCTOR JORGE BELIEVES THIS MEDICATION WOULD BE APPROPRATE TO HELP HIM WITH HIS WEIGHT. PLEASE CALL THE PATIENT AND ADVISE

## 2023-04-25 NOTE — TELEPHONE ENCOUNTER
----- Message from Gerson Simons sent at 4/25/2023  5:38 PM EDT -----  Regarding: Not sure my prescription was called in at  Contact: [phone]  Good evening,  I just received a voicemail that Wegovy was called into the pharmacy yesterday 4/24.  I called [facility] pharmacy and they have not received any request for this.  I’m not sure where this was called in at.  At help or Information in this matter would be great.   Thank you

## 2023-04-25 NOTE — TELEPHONE ENCOUNTER
Same question that was asked yesterday.  wegovy was sent in but I do not expect insurance to cover it.    He does not have DM so ozempic denied.

## 2023-04-26 RX ORDER — SEMAGLUTIDE 0.25 MG/.5ML
0.25 INJECTION, SOLUTION SUBCUTANEOUS WEEKLY
Qty: 2 ML | Refills: 1 | Status: SHIPPED | OUTPATIENT
Start: 2023-04-26

## 2023-04-26 NOTE — TELEPHONE ENCOUNTER
PATIENTS WIFE CALLED IN TO CHECK ON THE STATUS OF THE PATIENTS WEGOVY.     SHE IS WANTING TO KNOW IF IT IS BEING PRESCRIBED, SHE STATES THAT HE IS NOT ALREADY ON IT. SHE WANTS TO KNOW IF HE IS GOING TO BE PRESCRIBED WEGOVY AFTER HIS BLOOD WORK CAME BACK.

## 2023-04-26 NOTE — TELEPHONE ENCOUNTER
Caller: Lisa Simons    Relationship: Emergency Contact    Best call back number: [phone]    What medications are you currently taking:   Current Outpatient Medications on File Prior to Visit   Medication Sig Dispense Refill   • Cholecalciferol (VITAMIN D3) 5000 UNITS tablet Take  by mouth.     • lamoTRIgine (LaMICtal) 25 MG tablet 1 PO QD 1 week then 1 PO BID 1 week then 2 po QAM and 1 po QHS 1 WEEK then 2 po bid 120 tablet 1   • loratadine (Claritin) 10 MG tablet Take 1 tablet by mouth Daily. 90 tablet 1   • SUMAtriptan (Imitrex) 100 MG tablet Take one tablet at onset of headache. May repeat dose one time in 2 hours if headache not relieved. 27 tablet 1   • Testosterone 20.25 MG/ACT (1.62%) gel 2 pumps daily, apply as directed 75 g 5     No current facility-administered medications on file prior to visit.        What are your concerns: CALLED STATED THAT SHE WAS TOLD THAT RX SUSANNESABINO WAS SENT TO [facility] HAS NO RECORD OF RX AND BELIEVES THAT NURSE THAT SHE HAD SPOKEN WITH MAY HAVE GOTTEN RX MIXED UP WITH ANOTHER RX.

## 2023-04-28 NOTE — TELEPHONE ENCOUNTER
PHONE CALL FROM KATH.  PA NEEDED FOR WEGOVY THROUGH COVER MY MEDS.      KEY # [id number]    CALL IF NEEDED [phone]

## 2023-05-10 ENCOUNTER — OFFICE VISIT (OUTPATIENT)
Dept: BEHAVIORAL HEALTH | Facility: CLINIC | Age: 43
End: 2023-05-10
Payer: COMMERCIAL

## 2023-05-10 VITALS
WEIGHT: 288 LBS | HEIGHT: 72 IN | DIASTOLIC BLOOD PRESSURE: 80 MMHG | SYSTOLIC BLOOD PRESSURE: 125 MMHG | HEART RATE: 74 BPM | BODY MASS INDEX: 39.01 KG/M2

## 2023-05-10 DIAGNOSIS — F43.10 PTSD (POST-TRAUMATIC STRESS DISORDER): Primary | ICD-10-CM

## 2023-05-10 RX ORDER — LAMOTRIGINE 100 MG/1
100 TABLET ORAL DAILY
Qty: 30 TABLET | Refills: 2 | Status: SHIPPED | OUTPATIENT
Start: 2023-05-10 | End: 2024-05-09

## 2023-05-10 NOTE — PROGRESS NOTES
"     New Patient Office Visit      Patient Name: Gerson Simons  : 1980   MRN: [id number]      Referring Provider: Sumit Coates MD    Chief Complaint:  Psychiatric evaluation related to:     ICD-10-CM ICD-9-CM   1. PTSD (post-traumatic stress disorder)  F43.10 309.81        History of Present Illness:   Gerson Simons is a 42 y.o. male who is here today for psychiatric evaluation on referral from primary care provider related to possible PTSD.  Patient reports around 6 months ago he experienced significant depressive symptoms including anhedonia, fatigue, social withdrawal, increasing agitation and irritability.  Patient reports that he served 8 years in the [employer] and served in the [employer] completed several deployments overseas.  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  During the last 6 months patient reports that he has been experiencing episodes of anxiety, episodes of panic, and significant increase in stress.  Patient states \"my wife always asked me to go speak to somebody to see if I had any PTSD or to least talk somebody about my problems, soon I am here.\"  Patient affirms that he had been placed on Celexa and Effexor in the past related to depressive symptoms but found them to be ineffective.  He reports around 5 months ago he started experiencing significant suicidal ideation but denies ever formulating a suicide plan.  Stated \"it just really scared me and I just did not know what was going on with me.\"    Current symptoms include anhedonia, fatigue, decreased motivation, and social withdrawal.  Patient states \"I used to love to fish and do a lot of things but now things just do not give me any enjoyment.\"  Patient also reports that he no longer likes to be outside during the summer months stating \"heat is a severe trigger for me, I just get really angry and then become socially withdrawn.  I am not " "really sure what he does do with anything.\"  Patient also reports there was diagnosed with low testosterone and is currently receiving testosterone therapy.  Patient also reports low self-esteem stated \"I have gained a lot of weight and I do not like the way I look, I used to be able to run and do a lot of physical things which I cannot do anymore.\"      Subjective     Review of Systems:   Review of Systems   Constitutional: Negative.    Respiratory: Negative.    Cardiovascular: Negative.    Gastrointestinal: Negative.    Genitourinary: Negative.    Musculoskeletal: Negative.    Skin: Negative.    Neurological: Negative.    Psychiatric/Behavioral: Positive for dysphoric mood. The patient is nervous/anxious.         Assessment Scores:   MANUELITO-7 Score: MANUELITO 7 Total Score: 16  PHQ-9 Score: PHQ-9: Brief Depression Severity Measure Score: 8  PTSD: 59     Psychiatric Review of Systems:   Mood: normal   Anxiety: none  Jayne: none  Psychosis: none   Other: none    Work History:   Highest level of education obtained: Bachelors tow   Ever been active duty in the ? Yes, 8 years in the [employer], [employer] and [employer].   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  Self-injurious behavior: none    History of Substance Use/Abuse:   Alcohol: social use, few times a month    Drugs: none  Caffeine: 1 cup of coffee every morning  Tobacco: none  Supplements: hydroxide cut     Family Psychiatric History:  Unknown      Significant Life Events:   Verbal, physical, sexual abuse? no  Has patient experienced a death / loss of relationship? Yes, patient reported witnessing fellow soldiers die " in combat did not give specifics.  Has patient experienced a major accident or tragic events? Yes, witnessed significant trauma and tragic events during his time serving overseas.    Triggers: (Persons/Places/Things/Events/Thought/Emotions): Extreme heat is a trigger for anxiety and panic.    Social History:   Social History     Socioeconomic History   • Marital status:    Tobacco Use   • Smoking status: Never   • Smokeless tobacco: Never   Substance and Sexual Activity   • Alcohol use: Not Currently     Alcohol/week: 2.0 - 3.0 standard drinks     Types: 2 - 3 Cans of beer per week   • Drug use: No   • Sexual activity: Not Currently        Past Medical History:   Past Medical History:   Diagnosis Date   • Abnormal weight gain    • Anxiety    • Asthma    • Depression    • Diabetes mellitus     PreDiabetic   • Elevated liver enzymes    • Uvulitis    • Vitamin D deficiency        Past Surgical History:   Past Surgical History:   Procedure Laterality Date   • HAND SURGERY Left     removal of cyst       Family History:   Family History   Problem Relation Age of Onset   • Heart disease Father    • Bradycardia Father    • Diabetes Father          in    • Heart disease Paternal Grandfather        Medications:     Current Outpatient Medications:   •  Cholecalciferol (VITAMIN D3) 5000 UNITS tablet, Take  by mouth., Disp: , Rfl:   •  lamoTRIgine (LaMICtal) 100 MG tablet, Take 1 tablet by mouth Daily., Disp: 30 tablet, Rfl: 2  •  loratadine (Claritin) 10 MG tablet, Take 1 tablet by mouth Daily., Disp: 90 tablet, Rfl: 1  •  Semaglutide-Weight Management (Wegovy) 0.25 MG/0.5ML solution auto-injector, Inject 0.25 mg under the skin into the appropriate area as directed 1 (One) Time Per Week., Disp: 2 mL, Rfl: 1  •  SUMAtriptan (Imitrex) 100 MG tablet, Take one tablet at onset of headache. May repeat dose one time in 2 hours if headache not relieved., Disp: 27 tablet, Rfl: 1  •  Testosterone 20.25  "MG/ACT (1.62%) gel, 2 pumps daily, apply as directed, Disp: 75 g, Rfl: 5    Allergies:   Allergies   Allergen Reactions   • Effexor Xr [Venlafaxine Hcl Er] Other (See Comments)     Sweating, h/a          Objective     Physical Exam:  Vital Signs:   Vitals:    05/10/23 1548   BP: 125/80   Pulse: 74   Weight: 131 kg (288 lb)   Height: 182.9 cm (72\")     Body mass index is 39.06 kg/m².     Physical Exam    Mental Status Exam:   Hygiene:   good  Cooperation:  Cooperative  Eye Contact:  Good  Psychomotor Behavior:  Appropriate  Affect:  Restricted  Mood: depressed  Speech:  Normal  Thought Process:  Circum  Thought Content:  Mood congruent  Suicidal:  None  Homicidal:  None  Hallucinations:  None  Delusion:  None  Memory:  Intact  Orientation:  Grossly intact  Reliability:  good  Insight:  Fair  Judgement:  Good  Impulse Control:  Fair  Physical/Medical Issues:  Yes Several medical comorbidities, see chart     SUICIDE RISK ASSESSMENT/CSSRS:  1. Does patient have thoughts of suicide? no  2. Does patient have intent for suicide? no  3. Does patient have a current plan for suicide? no  4. History of suicide attempts: no  5. Family history of suicide or attempts: no  6. History of violent behaviors towards others or property or thoughts of committing suicide: yes  7. History of sexual aggression toward others: no  8. Access to firearms or weapons: yes, safe location     Assessment / Plan      Visit Diagnosis/Orders Placed This Visit:  Diagnoses and all orders for this visit:    1. PTSD (post-traumatic stress disorder) (Primary)  -     lamoTRIgine (LaMICtal) 100 MG tablet; Take 1 tablet by mouth Daily.  Dispense: 30 tablet; Refill: 2         Differential Diagnosis: MDD, prolonged grief disorder    PLAN:  1. Safety: No acute safety concerns  2. Risk Assessment: Risk of self-harm acutely is low. Risk of self-harm chronically is also low, but could be further elevated in the event of treatment noncompliance and/or " AODA.  3. Increase lamotrigine to 100 mg daily.  Instructed patient to take before bedtime if he felt medication to be sedating.  4. Initiate psychotherapy with APRN.  5. Recommendation: Sleep hygiene and self-care.  6. Discussed having open communication with spouse.    Treatment Plan/Goals: Continue supportive psychotherapy efforts and medications as indicated. Treatment and medication options discussed during today's visit. Patient ackowledged and verbally consented to continue with current treatment plan and was educated on the importance of compliance with treatment and follow-up appointments. Patient seems reasonably able to adhere to treatment plan.    Assisted Patient in processing above session content; acknowledged and normalized patient’s thoughts, feelings, and concerns.  Rationalized patient thought process regarding psychotropic medication for behavior health symptomology.      Allowed Patient to freely discuss issues without interruption or judgement with unconditional positive regard, active listening skills, and empathy. Therapist provided a safe, confidential environment to facilitate the development of a positive therapeutic relationship and encouraged open, honest communication. Assisted Patient in identifying risk factors which would indicate the need for higher level of care including thoughts to harm self or others and/or self-harming behavior and encouraged Patient to contact this office, call 911, or present to the nearest emergency room should any of these events occur. Discussed crisis intervention services and means to access. Patient adamantly and convincingly denies current suicidal or homicidal ideation or perceptual disturbance. Assisted Patient in processing session content; acknowledged and normalized Patient’s thoughts, feelings, and concerns by utilizing a person-centered approach in efforts to build appropriate rapport and a positive therapeutic relationship with open and honest  communication.     Quality Measures:     TOBACCO USE:  Never smoker    I advised Gerson of the risks of tobacco use.     Follow Up:   Return in about 23 days (around 6/2/2023).      ECTOR Holm, PMHNP-BC

## 2023-06-02 ENCOUNTER — OFFICE VISIT (OUTPATIENT)
Dept: BEHAVIORAL HEALTH | Facility: CLINIC | Age: 43
End: 2023-06-02
Payer: COMMERCIAL

## 2023-06-02 DIAGNOSIS — F43.10 PTSD (POST-TRAUMATIC STRESS DISORDER): Primary | ICD-10-CM

## 2023-06-02 NOTE — PROGRESS NOTES
"     Office  Follow Up Visit      Patient Name: Gerson Simons  : 1980   MRN: [id number]     Referring Provider: Sumit Coates MD    Chief Complaint: Medication follow-up    ICD-10-CM ICD-9-CM   1. PTSD (post-traumatic stress disorder)  F43.10 309.81        History of Present Illness:   Gerson Simons is a 42 y.o. male who is here today for follow up psychotherapy.  Patient reports since initiating lamotrigine he has seen a significant improvement in mood stability, decreased anxiety, and decreased panic.  Patient also reports that he has been journaling daily as recommended and states \"I find it to be very helpful.\"  Patient reports that he feels \"I am getting my life back.\"      Subjective     Review of Systems:   Review of Systems   Constitutional: Negative.    Respiratory: Negative.     Cardiovascular: Negative.    Gastrointestinal: Negative.    Genitourinary: Negative.    Musculoskeletal: Negative.    Skin: Negative.    Neurological: Negative.    Psychiatric/Behavioral:  Positive for dysphoric mood. The patient is nervous/anxious.        Patient History:   The following portions of the patient's history were reviewed and updated as appropriate: allergies, current medications, past family history, past medical history, past social history, past surgical history and problem list.     Social:   No change in interval history.    Medications:     Current Outpatient Medications:     Cholecalciferol (VITAMIN D3) 5000 UNITS tablet, Take  by mouth., Disp: , Rfl:     lamoTRIgine (LaMICtal) 100 MG tablet, Take 1 tablet by mouth Daily., Disp: 30 tablet, Rfl: 2    loratadine (Claritin) 10 MG tablet, Take 1 tablet by mouth Daily., Disp: 90 tablet, Rfl: 1    Semaglutide-Weight Management (Wegovy) 0.25 MG/0.5ML solution auto-injector, Inject 0.25 mg under the skin into the appropriate area as directed 1 (One) Time Per Week., Disp: 2 mL, Rfl: 1    SUMAtriptan (Imitrex) 100 MG tablet, Take one tablet at onset of " "headache. May repeat dose one time in 2 hours if headache not relieved., Disp: 27 tablet, Rfl: 1    Testosterone 20.25 MG/ACT (1.62%) gel, 2 pumps daily, apply as directed, Disp: 75 g, Rfl: 5    Objective     Physical Exam:  Vital Signs:   Vitals:    06/06/23 0710   BP: 120/70   Weight: 131 kg (288 lb)   Height: 182.9 cm (72\")     Body mass index is 39.06 kg/m².     Mental Status Exam:   Hygiene:   good  Cooperation:  Cooperative  Eye Contact:  Good  Psychomotor Behavior:  Appropriate  Affect:  Appropriate  Mood: normal and fluctates  Speech:  Normal  Thought Process:  Circum  Thought Content:  Mood congruent  Suicidal:  None  Homicidal:  None  Hallucinations:  None  Delusion:  None  Memory:  Intact  Orientation:  Grossly intact  Reliability:  good  Insight:  Fair  Judgement:  Fair  Impulse Control:  Fair  Physical/Medical Issues:  Yes several medical comorbidities, see chart      Assessment / Plan      Visit Diagnosis/Orders Placed This Visit:  Diagnoses and all orders for this visit:    1. PTSD (post-traumatic stress disorder) (Primary)         Plan:   Continue psychotropic medication as prescribed: Lamotrigine 100 mg daily.  Continue psychotherapy with APRN as scheduled.  Continue journaling every other day.  Discussed grief process and stress management.  Urged patient to discover different avenues to alleviate stress and to find enjoyment throughout his day.    Continue supportive psychotherapy efforts and medications as indicated. Treatment and medication options discussed during today's visit. Patient ackowledged and verbally consented to continue with current treatment plan and was educated on the importance of compliance with treatment and follow-up appointments. Patient seems reasonably able to adhere to treatment plan.      Medication Considerations:  Discussed medication options and treatment plan of prescribed medication(s) as well as the risks, benefits, and potential side effects. Patient is agreeable " to call the office with any worsening of symptoms or onset of side effects. Patient is agreeable to call 911 or go to the nearest ER should he/she begin having SI/HI.    Therapy Plan:     Need for therapy based on combat related PTSD and repression of symptoms and grief.  Patient is to continue every other day journaling.  Patient is to allow himself to grieve and specific moments throughout the day.  Encouraged patient to take breaks from the grief process and therapy process at times.    Description of Therapy:   45 minutes spent engaged in supportive psychotherapy with the patient. Mental health diagnoses were addressed in therapy. Utilized supportive approach to engage patient in developing a safe space for patient to process presenting concerns. Clarified presenting problem; employed motivational interviewing techniques to gauge change readiness and devise treatment goals and objectives. Engaged client through insight-oriented approach to create further understanding of client's patterns. Utilized empathy and positive regard to encourage continued development of therapeutic relationship.     Session Started: 1605  Session Ended: 1650    Topics Discussed:   Discussed the journaling process and how to use journaling as a tool to alleviate racing thoughts, daily worries, and writing out fears.  Discussed the grief process and allowing oneself to feel all stages.  Encouraged patient to continue open communication with his spouse.  Discussed stress relief and ways to minimize daily stress.    Quality Measures:   Never smoker    I advised Gerson of the risks of tobacco use.     Follow Up:   Return in about 2 weeks (around 6/16/2023) for Psychotherapy.      ECTOR Holm, PMHNP-BC

## 2023-06-06 VITALS
HEIGHT: 72 IN | DIASTOLIC BLOOD PRESSURE: 70 MMHG | SYSTOLIC BLOOD PRESSURE: 120 MMHG | WEIGHT: 288 LBS | BODY MASS INDEX: 39.01 KG/M2

## 2023-08-11 ENCOUNTER — OFFICE VISIT (OUTPATIENT)
Dept: BEHAVIORAL HEALTH | Facility: CLINIC | Age: 43
End: 2023-08-11
Payer: COMMERCIAL

## 2023-08-11 VITALS — HEIGHT: 72 IN | WEIGHT: 280 LBS | BODY MASS INDEX: 37.93 KG/M2

## 2023-08-11 DIAGNOSIS — F43.10 PTSD (POST-TRAUMATIC STRESS DISORDER): ICD-10-CM

## 2023-08-11 RX ORDER — LAMOTRIGINE 100 MG/1
100 TABLET ORAL DAILY
Qty: 90 TABLET | Refills: 1 | Status: SHIPPED | OUTPATIENT
Start: 2023-08-11 | End: 2024-08-10

## 2023-08-11 NOTE — PROGRESS NOTES
"     Office  Follow Up Visit      Patient Name: Gerson Simons  : 1980   MRN: [id number]     Referring Provider: Sumit Coates MD    Chief Complaint: Psychotherapy follow-up    ICD-10-CM ICD-9-CM   1. PTSD (post-traumatic stress disorder)  F43.10 309.81        History of Present Illness:   Gerson Simons is a 42 y.o. male who is here today for follow up psychotherapy related to combat associated PTSD.  Patient reports that he continually has seen improvement with behavioral changes, continued medication, and journaling.  Patient reports that at the encouragement of his wife he brought out his old dress uniform and medals and showed it to his son.  Patient states \"I am actually starting to have more fond memories of my past, and learning that it is not all bad.\"  He also reports that he has been engaging in more physical exercise and weight training throughout the week.      Subjective     Review of Systems:   Review of Systems   Constitutional: Negative.    Respiratory: Negative.     Cardiovascular: Negative.    Gastrointestinal: Negative.    Genitourinary: Negative.    Musculoskeletal: Negative.    Neurological: Negative.    Psychiatric/Behavioral: Negative.         Patient History:   The following portions of the patient's history were reviewed and updated as appropriate: allergies, current medications, past family history, past medical history, past social history, past surgical history and problem list.     Social:   No change in interval history.    Medications:     Current Outpatient Medications:     lamoTRIgine (LaMICtal) 100 MG tablet, Take 1 tablet by mouth Daily., Disp: 90 tablet, Rfl: 1    Cholecalciferol (VITAMIN D3) 5000 UNITS tablet, Take  by mouth., Disp: , Rfl:     loratadine (Claritin) 10 MG tablet, Take 1 tablet by mouth Daily., Disp: 90 tablet, Rfl: 1    SUMAtriptan (Imitrex) 100 MG tablet, Take one tablet at onset of headache. May repeat dose one time in 2 hours if headache not " "relieved., Disp: 27 tablet, Rfl: 1    Testosterone 20.25 MG/ACT (1.62%) gel, 2 pumps daily, apply as directed, Disp: 75 g, Rfl: 5    Objective     Physical Exam:  Vital Signs:   Vitals:    08/11/23 1543   Weight: 127 kg (280 lb)   Height: 182.9 cm (72\")     Body mass index is 37.97 kg/mý.     Mental Status Exam:   Hygiene:   good  Cooperation:  Cooperative  Eye Contact:  Good  Psychomotor Behavior:  Appropriate  Affect:  Appropriate  Mood: normal  Speech:  Normal  Thought Process:  Goal directed  Thought Content:  Mood congruent  Suicidal:  None  Homicidal:  None  Hallucinations:  None  Delusion:  None  Memory:  Intact  Orientation:  Grossly intact  Reliability:  good  Insight:  Fair  Judgement:  Fair  Impulse Control:  Fair  Physical/Medical Issues:  No      Assessment / Plan      Visit Diagnosis/Orders Placed This Visit:  Diagnoses and all orders for this visit:    1. PTSD (post-traumatic stress disorder)  -     lamoTRIgine (LaMICtal) 100 MG tablet; Take 1 tablet by mouth Daily.  Dispense: 90 tablet; Refill: 1         Plan:   Continue lamotrigine 100 mg daily.  Continue psychotherapy with APRN as scheduled.  Continue journaling and self-care.  Recommendation: Continued physical exercise and stress management.    Continue supportive psychotherapy efforts and medications as indicated. Treatment and medication options discussed during today's visit. Patient ackowledged and verbally consented to continue with current treatment plan and was educated on the importance of compliance with treatment and follow-up appointments. Patient seems reasonably able to adhere to treatment plan.      Medication Considerations:  Discussed medication options and treatment plan of prescribed medication(s) as well as the risks, benefits, and potential side effects. Patient is agreeable to call the office with any worsening of symptoms or onset of side effects. Patient is agreeable to call 911 or go to the nearest ER should he/she begin " having SI/HI.    Therapy Plan:     Need for therapy based on PTSD and associated emotions, suppressed emotions, and associated anxiety.    Description of Therapy:   50 minutes spent engaged in supportive psychotherapy with the patient. Mental health diagnoses were addressed in therapy. Utilized supportive approach to engage patient in developing a safe space for patient to process presenting concerns. Clarified presenting problem; employed motivational interviewing techniques to gauge change readiness and devise treatment goals and objectives. Engaged client through insight-oriented approach to create further understanding of client's patterns. Utilized empathy and positive regard to encourage continued development of therapeutic relationship.     Session Started: 1600  Session Ended: 1650    Topics Discussed:   Discussed ways to live in the present and focus on the present.  Encouraged patient to be engaged in the moment.  Discussed with patient that his past as part of who he is and that he should embrace all of his past in order to embrace and accept all of himself.    Quality Measures:   Never smoker    I advised Gerson of the risks of tobacco use.     Follow Up:   Return in about 1 month (around 9/11/2023) for Psychotherapy.      ECTOR Holm, PMHNP-BC

## 2023-09-08 ENCOUNTER — OFFICE VISIT (OUTPATIENT)
Dept: BEHAVIORAL HEALTH | Facility: CLINIC | Age: 43
End: 2023-09-08
Payer: COMMERCIAL

## 2023-09-08 DIAGNOSIS — F43.10 PTSD (POST-TRAUMATIC STRESS DISORDER): Primary | ICD-10-CM

## 2023-09-08 NOTE — PROGRESS NOTES
"     Office  Follow Up Visit      Patient Name: Gerson Simons  : 1980   MRN: [id number]     Referring Provider: Sumit Coates MD    Chief Complaint:  Medication follow-up     ICD-10-CM ICD-9-CM   1. PTSD (post-traumatic stress disorder)  F43.10 309.81        History of Present Illness:   Gerson Simons is a 42 y.o. male who is here today for follow up psychotherapy related to combat associated PTSD.  Patient reports that he is continually felt increasingly better.  Patient reports that he is more engaged in the moment and has been more active with his family.  Patient also reports she has seen a significant decrease in his nightmares and states \"I feel like I am starting to live again.\"      Subjective     Review of Systems:   Review of Systems   Constitutional: Negative.    Respiratory: Negative.     Cardiovascular: Negative.    Gastrointestinal: Negative.    Genitourinary: Negative.    Musculoskeletal: Negative.    Neurological: Negative.    Psychiatric/Behavioral: Negative.         Patient History:   The following portions of the patient's history were reviewed and updated as appropriate: allergies, current medications, past family history, past medical history, past social history, past surgical history and problem list.     Social:   No change in interval history.     Medications:     Current Outpatient Medications:     Cholecalciferol (VITAMIN D3) 5000 UNITS tablet, Take  by mouth., Disp: , Rfl:     lamoTRIgine (LaMICtal) 100 MG tablet, Take 1 tablet by mouth Daily., Disp: 90 tablet, Rfl: 1    loratadine (Claritin) 10 MG tablet, Take 1 tablet by mouth Daily., Disp: 90 tablet, Rfl: 1    SUMAtriptan (Imitrex) 100 MG tablet, Take one tablet at onset of headache. May repeat dose one time in 2 hours if headache not relieved., Disp: 27 tablet, Rfl: 1    Testosterone 20.25 MG/ACT (1.62%) gel, 2 pumps daily, apply as directed, Disp: 75 g, Rfl: 5    Objective     Physical Exam:  Vital Signs:   Vitals:    " "09/12/23 0723   BP: 124/76   Weight: 127 kg (280 lb)   Height: 182.9 cm (72\")     Body mass index is 37.97 kg/m².     Mental Status Exam:   Hygiene:   good  Cooperation:  Cooperative  Eye Contact:  Good  Psychomotor Behavior:  Appropriate  Affect:  Appropriate  Mood: normal  Speech:  Normal  Thought Process:  Goal directed  Thought Content:  Normal  Suicidal:  None  Homicidal:  None  Hallucinations:  None  Delusion:  None  Memory:  Intact  Orientation:  Grossly intact  Reliability:  good  Insight:  Good  Judgement:  Good  Impulse Control:  Good  Physical/Medical Issues:  Yes several medical comorbidities, see chart      Assessment / Plan      Visit Diagnosis/Orders Placed This Visit:  Diagnoses and all orders for this visit:    1. PTSD (post-traumatic stress disorder) (Primary)         Plan:   Continue psychotropic medication as prescribed.  Continue psychotherapy with APRN as scheduled.  Continue journaling.  Self-care and positive self talk.    Continue supportive psychotherapy efforts and medications as indicated. Treatment and medication options discussed during today's visit. Patient ackowledged and verbally consented to continue with current treatment plan and was educated on the importance of compliance with treatment and follow-up appointments. Patient seems reasonably able to adhere to treatment plan.      Medication Considerations:  Discussed medication options and treatment plan of prescribed medication(s) as well as the risks, benefits, and potential side effects. Patient is agreeable to call the office with any worsening of symptoms or onset of side effects. Patient is agreeable to call 911 or go to the nearest ER should he/she begin having SI/HI.    Therapy Plan:     Need for therapy based on continued processing of past traumas associated with combat and loss of friends.  Continued grief process associated with losses.  Learning to focus on the present and to be engaged in the moment.  Discussed " finding layla in the present and the things around him such as his wife and children.    Description of Therapy:   50 minutes spent engaged in supportive psychotherapy with the patient. Mental health diagnoses were addressed in therapy. Utilized supportive approach to engage patient in developing a safe space for patient to process presenting concerns. Clarified presenting problem; employed motivational interviewing techniques to gauge change readiness and devise treatment goals and objectives. Engaged client through insight-oriented approach to create further understanding of client's patterns. Utilized empathy and positive regard to encourage continued development of therapeutic relationship.     Session Started: 1605  Session Ended: 1655    Topics Discussed:   Reconciliation from the past in order to let go of the past and preventing ones past to dictate one's present.  Focused on areas of self-improvement including increased physical activity, engagement in the family, self-care, and accepting once past  career as being part of who he is not his past as a separate entity.    Quality Measures:   Never smoker    I advised Gerson of the risks of tobacco use.     Follow Up:   Return in about 1 month (around 10/8/2023) for Psychotherapy.      ECTOR Holm, PMHNP-BC

## 2023-09-12 VITALS
DIASTOLIC BLOOD PRESSURE: 76 MMHG | WEIGHT: 280 LBS | HEIGHT: 72 IN | SYSTOLIC BLOOD PRESSURE: 124 MMHG | BODY MASS INDEX: 37.93 KG/M2

## 2024-01-24 ENCOUNTER — TELEPHONE (OUTPATIENT)
Dept: FAMILY MEDICINE CLINIC | Facility: CLINIC | Age: 44
End: 2024-01-24
Payer: COMMERCIAL

## 2024-08-09 ENCOUNTER — OFFICE VISIT (OUTPATIENT)
Dept: BEHAVIORAL HEALTH | Facility: CLINIC | Age: 44
End: 2024-08-09
Payer: COMMERCIAL

## 2024-08-09 VITALS — BODY MASS INDEX: 37.25 KG/M2 | HEIGHT: 72 IN | WEIGHT: 275 LBS

## 2024-08-09 DIAGNOSIS — F43.10 PTSD (POST-TRAUMATIC STRESS DISORDER): Primary | ICD-10-CM

## 2024-08-09 NOTE — PROGRESS NOTES
"     Office  Follow Up Visit      Patient Name: Gerson Simons  : 1980   MRN: [id number]     Referring Provider: Sumit Coates MD    Chief Complaint: Medication follow-up    ICD-10-CM ICD-9-CM   1. PTSD (post-traumatic stress disorder)  F43.10 309.81        History of Present Illness:   Gerson Simons is a 43 y.o. male who is here today for follow up related to patient management of PTSD.    Patient is here for reestablishment of care.  Patient had to an care related to insurance purposes.  However, patient's company has a new insurance which is covered by [employer] at this time.    [facility].  He reports that he did enroll to complete a 13-week PTSD program.  Patient reports that they continue lamotrigine 100 mg.  He also reports that he has been upgraded to 80% disability through the [facility].    Patient reports that since completing a 13-week PTSD program through the [facility] he has seen an overall decrease in mood, as well as an increase in anxiety and worry.  Patient reports that he felt like he was doing a lot better when he was doing psychotherapy and states \"they did really do that there, they just did not let us talk about the stuff.\"    He reports that he has seen an increase in his worry especially when his family leaves.  He states \"I Ally always think the worst case scenario, like something bad is going to happen.\"  Patient reports that he still struggles in social situations with social anxiety but has been \"forcing myself\" to do more things in that regard.  He affirms that it has been helpful.  Patient reports no side effects with lamotrigine.  He reports an uptake in his agitation which lasts around an hour once he is able to recognize it and remove himself from the situation and decompress.    Patient request reinitiation of therapy with APRN.  Stating \"I definitely felt like things were better when I was meeting " "with you regularly.\"    Subjective      Review of Systems:   Review of Systems   Constitutional: Negative.    Respiratory: Negative.     Cardiovascular: Negative.    Gastrointestinal: Negative.    Genitourinary: Negative.    Musculoskeletal: Negative.    Neurological: Negative.    Psychiatric/Behavioral:  Positive for agitation. The patient is nervous/anxious.        Patient History:   The following portions of the patient's history were reviewed and updated as appropriate: allergies, current medications, past family history, past medical history, past social history, past surgical history and problem list.     Social:  No change interval history.    Medications:     Current Outpatient Medications:     Cholecalciferol (VITAMIN D3) 5000 UNITS tablet, Take  by mouth., Disp: , Rfl:     lamoTRIgine (LaMICtal) 100 MG tablet, Take 1 tablet by mouth Daily., Disp: 90 tablet, Rfl: 1    loratadine (Claritin) 10 MG tablet, Take 1 tablet by mouth Daily., Disp: 90 tablet, Rfl: 1    SUMAtriptan (Imitrex) 100 MG tablet, Take one tablet at onset of headache. May repeat dose one time in 2 hours if headache not relieved., Disp: 27 tablet, Rfl: 1    Objective     Physical Exam:  Vital Signs:   Vitals:    08/09/24 1715   BP: Comment: cuff error   Weight: 125 kg (275 lb)   Height: 182.9 cm (72\")     Body mass index is 37.3 kg/m².     Mental Status Exam:   Hygiene:   good  Cooperation:  Cooperative  Eye Contact:  Good  Psychomotor Behavior:  Appropriate  Affect:  Appropriate  Mood: fluctates  Speech:  Normal  Thought Process:  Circum  Thought Content:  Mood congruent  Suicidal:  None  Homicidal:  None  Hallucinations:  None  Delusion:  None  Memory:  Intact  Orientation:  Grossly intact  Reliability:  good  Insight:  Good  Judgement:  Good  Impulse Control:  Fair  Physical/Medical Issues:  No      Assessment / Plan      Visit Diagnosis/Orders Placed This Visit:  Diagnoses and all orders for this visit:    1. PTSD (post-traumatic stress " disorder) (Primary)         Differential:   N/A    Plan:   Reinitiate psychotherapy with APRN.  Continue with lamotrigine 100 mg as prescribed.    Continue supportive psychotherapy efforts and medications as indicated. Treatment and medication options discussed during today's visit. Patient ackowledged and verbally consented to continue with current treatment plan and was educated on the importance of compliance with treatment and follow-up appointments. Patient seems reasonably able to adhere to treatment plan.      Medication Considerations:  Discussed medication options and treatment plan of prescribed medication(s) as well as the risks, benefits, and potential side effects. Patient is agreeable to call the office with any worsening of symptoms or onset of side effects. Patient is agreeable to call 911 or go to the nearest ER should he/she begin having SI/HI.    Quality Measures:   Never smoker    I advised Gerson Simons of the risks of tobacco use.     Follow Up:   Return in about 1 month (around 9/9/2024) for Reinitiation of psychotherapy.      ECTOR Holm, PMHNP-BC

## 2025-06-13 ENCOUNTER — OFFICE VISIT (OUTPATIENT)
Dept: FAMILY MEDICINE CLINIC | Facility: CLINIC | Age: 45
End: 2025-06-13
Payer: COMMERCIAL

## 2025-06-13 VITALS
SYSTOLIC BLOOD PRESSURE: 118 MMHG | OXYGEN SATURATION: 98 % | WEIGHT: 276 LBS | HEART RATE: 78 BPM | BODY MASS INDEX: 37.38 KG/M2 | TEMPERATURE: 100 F | HEIGHT: 72 IN | DIASTOLIC BLOOD PRESSURE: 82 MMHG | RESPIRATION RATE: 18 BRPM

## 2025-06-13 DIAGNOSIS — G47.33 OSA (OBSTRUCTIVE SLEEP APNEA): Primary | ICD-10-CM

## 2025-06-13 PROBLEM — J45.909 ASTHMA: Status: ACTIVE | Noted: 2025-06-13

## 2025-06-13 PROCEDURE — 99213 OFFICE O/P EST LOW 20 MIN: CPT | Performed by: FAMILY MEDICINE

## 2025-06-13 RX ORDER — ALBUTEROL SULFATE 90 UG/1
INHALANT RESPIRATORY (INHALATION)
COMMUNITY
Start: 2025-03-16

## 2025-06-13 RX ORDER — HYDROXYZINE HYDROCHLORIDE 10 MG/1
TABLET, FILM COATED ORAL
COMMUNITY
Start: 2025-03-02

## 2025-06-13 RX ORDER — TESTOSTERONE 20.25 MG/1.25G
GEL TOPICAL
COMMUNITY

## 2025-06-13 RX ORDER — MONTELUKAST SODIUM 10 MG/1
10 TABLET ORAL DAILY
COMMUNITY

## 2025-06-13 RX ORDER — PRAZOSIN HYDROCHLORIDE 1 MG/1
CAPSULE ORAL
COMMUNITY
Start: 2025-04-25

## 2025-06-13 NOTE — PROGRESS NOTES
Subjective   Gerson Simons is a 44 y.o. male.     Asthma  His past medical history is significant for asthma.   Sleep Apnea       HE was told he has asthma, chronic rhinitis and chronic sinusisit    He had sleep apnea diagnosed in 2019  AHI was 17.6-28.1  He has been using the CPAP machine on regular basis    He was told that if sleep study is caused by the above issues the [facility] would [facility] supplies?        The following portions of the patient's history were reviewed and updated as appropriate: allergies, current medications, past family history, past medical history, past social history, past surgical history, and problem list.    Review of Systems   Constitutional: Negative.    Psychiatric/Behavioral: Negative.         Objective   Physical Exam  Vitals and nursing note reviewed.   Constitutional:       General: He is not in acute distress.     Appearance: Normal appearance. He is well-developed.   Cardiovascular:      Rate and Rhythm: Normal rate and regular rhythm.      Heart sounds: Normal heart sounds.   Pulmonary:      Effort: Pulmonary effort is normal.      Breath sounds: Normal breath sounds.   Neurological:      Mental Status: He is alert and oriented to person, place, and time.   Psychiatric:         Mood and Affect: Mood normal.         Behavior: Behavior normal.         Thought Content: Thought content normal.         Judgment: Judgment normal.       Assessment & Plan   Diagnoses and all orders for this visit:    1. JUAN (obstructive sleep apnea) (Primary)  -     Ambulatory Referral to Sleep Medicine    I am going to have him discussed sleep apnea and relationship to other chronic health issues.  Pt agrees